# Patient Record
Sex: FEMALE | Race: WHITE | NOT HISPANIC OR LATINO
[De-identification: names, ages, dates, MRNs, and addresses within clinical notes are randomized per-mention and may not be internally consistent; named-entity substitution may affect disease eponyms.]

---

## 2017-01-03 ENCOUNTER — NON-APPOINTMENT (OUTPATIENT)
Age: 67
End: 2017-01-03

## 2017-01-03 ENCOUNTER — APPOINTMENT (OUTPATIENT)
Dept: INTERNAL MEDICINE | Facility: CLINIC | Age: 67
End: 2017-01-03

## 2017-01-03 VITALS — BODY MASS INDEX: 29.29 KG/M2 | WEIGHT: 165.34 LBS

## 2017-01-03 VITALS — TEMPERATURE: 97.5 F | HEART RATE: 68 BPM | SYSTOLIC BLOOD PRESSURE: 132 MMHG | DIASTOLIC BLOOD PRESSURE: 74 MMHG

## 2017-01-04 ENCOUNTER — RESULT CHARGE (OUTPATIENT)
Age: 67
End: 2017-01-04

## 2017-01-04 LAB
25(OH)D3 SERPL-MCNC: 26.2 NG/ML
ALBUMIN SERPL ELPH-MCNC: 4.3 G/DL
ALP BLD-CCNC: 39 U/L
ALT SERPL-CCNC: 11 U/L
ANION GAP SERPL CALC-SCNC: 16 MMOL/L
AST SERPL-CCNC: 9 U/L
BASOPHILS # BLD AUTO: 0.06 K/UL
BASOPHILS NFR BLD AUTO: 0.7 %
BILIRUB SERPL-MCNC: 0.7 MG/DL
BILIRUB UR QL STRIP: NEGATIVE
BUN SERPL-MCNC: 21 MG/DL
CALCIUM SERPL-MCNC: 9.6 MG/DL
CHLORIDE SERPL-SCNC: 103 MMOL/L
CHOLEST SERPL-MCNC: 208 MG/DL
CHOLEST/HDLC SERPL: 5.6 RATIO
CO2 SERPL-SCNC: 24 MMOL/L
CREAT SERPL-MCNC: 0.79 MG/DL
CREAT SPEC-SCNC: 108 MG/DL
EOSINOPHIL # BLD AUTO: 0.42 K/UL
EOSINOPHIL NFR BLD AUTO: 5 %
GLUCOSE SERPL-MCNC: 116 MG/DL
GLUCOSE UR-MCNC: NEGATIVE
HBA1C MFR BLD HPLC: 6.6 %
HCG UR QL: 0.2 EU/DL
HCT VFR BLD CALC: 34.8 %
HDLC SERPL-MCNC: 37 MG/DL
HGB BLD-MCNC: 11.3 G/DL
HGB UR QL STRIP.AUTO: NORMAL
IMM GRANULOCYTES NFR BLD AUTO: 0.2 %
KETONES UR-MCNC: NORMAL
LDLC SERPL CALC-MCNC: 125 MG/DL
LEUKOCYTE ESTERASE UR QL STRIP: NORMAL
LYMPHOCYTES # BLD AUTO: 2.76 K/UL
LYMPHOCYTES NFR BLD AUTO: 33.1 %
MAN DIFF?: NORMAL
MCHC RBC-ENTMCNC: 28.2 PG
MCHC RBC-ENTMCNC: 32.5 GM/DL
MCV RBC AUTO: 86.8 FL
MICROALBUMIN 24H UR DL<=1MG/L-MCNC: 4.5 MG/DL
MICROALBUMIN/CREAT 24H UR-RTO: 41 UG/MG
MONOCYTES # BLD AUTO: 0.33 K/UL
MONOCYTES NFR BLD AUTO: 4 %
NEUTROPHILS # BLD AUTO: 4.75 K/UL
NEUTROPHILS NFR BLD AUTO: 57 %
NITRITE UR QL STRIP: NEGATIVE
PH UR STRIP: 5.5
PLATELET # BLD AUTO: 231 K/UL
POTASSIUM SERPL-SCNC: 4 MMOL/L
PROT SERPL-MCNC: 6.7 G/DL
PROT UR STRIP-MCNC: NEGATIVE
RBC # BLD: 4.01 M/UL
RBC # FLD: 13.7 %
SODIUM SERPL-SCNC: 143 MMOL/L
SP GR UR STRIP: 1.03
TRIGL SERPL-MCNC: 228 MG/DL
WBC # FLD AUTO: 8.34 K/UL

## 2017-02-16 ENCOUNTER — FORM ENCOUNTER (OUTPATIENT)
Age: 67
End: 2017-02-16

## 2017-02-17 ENCOUNTER — APPOINTMENT (OUTPATIENT)
Dept: MAMMOGRAPHY | Facility: IMAGING CENTER | Age: 67
End: 2017-02-17

## 2017-02-17 ENCOUNTER — APPOINTMENT (OUTPATIENT)
Dept: RADIOLOGY | Facility: IMAGING CENTER | Age: 67
End: 2017-02-17

## 2017-02-17 ENCOUNTER — OUTPATIENT (OUTPATIENT)
Dept: OUTPATIENT SERVICES | Facility: HOSPITAL | Age: 67
LOS: 1 days | End: 2017-02-17
Payer: MEDICARE

## 2017-02-17 DIAGNOSIS — Z00.8 ENCOUNTER FOR OTHER GENERAL EXAMINATION: ICD-10-CM

## 2017-02-17 PROCEDURE — 77067 SCR MAMMO BI INCL CAD: CPT

## 2017-02-17 PROCEDURE — 77080 DXA BONE DENSITY AXIAL: CPT

## 2017-02-17 PROCEDURE — 77063 BREAST TOMOSYNTHESIS BI: CPT

## 2017-05-15 ENCOUNTER — RX RENEWAL (OUTPATIENT)
Age: 67
End: 2017-05-15

## 2017-05-18 ENCOUNTER — RX RENEWAL (OUTPATIENT)
Age: 67
End: 2017-05-18

## 2017-05-19 ENCOUNTER — RX RENEWAL (OUTPATIENT)
Age: 67
End: 2017-05-19

## 2018-02-01 ENCOUNTER — NON-APPOINTMENT (OUTPATIENT)
Age: 68
End: 2018-02-01

## 2018-02-01 ENCOUNTER — APPOINTMENT (OUTPATIENT)
Dept: INTERNAL MEDICINE | Facility: CLINIC | Age: 68
End: 2018-02-01
Payer: MEDICARE

## 2018-02-01 VITALS
DIASTOLIC BLOOD PRESSURE: 73 MMHG | SYSTOLIC BLOOD PRESSURE: 135 MMHG | HEART RATE: 74 BPM | RESPIRATION RATE: 14 BRPM | TEMPERATURE: 98.6 F

## 2018-02-01 DIAGNOSIS — T50.905A TOXIC LIVER DISEASE WITH HEPATITIS, NOT ELSEWHERE CLASSIFIED: ICD-10-CM

## 2018-02-01 DIAGNOSIS — K71.6 TOXIC LIVER DISEASE WITH HEPATITIS, NOT ELSEWHERE CLASSIFIED: ICD-10-CM

## 2018-02-01 LAB
BILIRUB UR QL STRIP: NEGATIVE
GLUCOSE UR-MCNC: NEGATIVE
HCG UR QL: 0.2 EU/DL
HGB UR QL STRIP.AUTO: NORMAL
KETONES UR-MCNC: NEGATIVE
LEUKOCYTE ESTERASE UR QL STRIP: NORMAL
NITRITE UR QL STRIP: NEGATIVE
PH UR STRIP: 5.5
PROT UR STRIP-MCNC: NEGATIVE
SP GR UR STRIP: 1.02

## 2018-02-01 PROCEDURE — 93000 ELECTROCARDIOGRAM COMPLETE: CPT | Mod: 59

## 2018-02-01 PROCEDURE — 82272 OCCULT BLD FECES 1-3 TESTS: CPT

## 2018-02-01 PROCEDURE — 36415 COLL VENOUS BLD VENIPUNCTURE: CPT

## 2018-02-01 PROCEDURE — 81003 URINALYSIS AUTO W/O SCOPE: CPT | Mod: QW

## 2018-02-01 PROCEDURE — G0439: CPT

## 2018-02-01 PROCEDURE — 99215 OFFICE O/P EST HI 40 MIN: CPT | Mod: 25

## 2018-02-02 ENCOUNTER — RX RENEWAL (OUTPATIENT)
Age: 68
End: 2018-02-02

## 2018-02-02 LAB
25(OH)D3 SERPL-MCNC: 25.3 NG/ML
ALBUMIN SERPL ELPH-MCNC: 4.3 G/DL
ALP BLD-CCNC: 36 U/L
ALT SERPL-CCNC: 11 U/L
ANION GAP SERPL CALC-SCNC: 14 MMOL/L
AST SERPL-CCNC: 15 U/L
BASOPHILS # BLD AUTO: 0.06 K/UL
BASOPHILS NFR BLD AUTO: 1.1 %
BILIRUB SERPL-MCNC: 0.8 MG/DL
BUN SERPL-MCNC: 21 MG/DL
CALCIUM SERPL-MCNC: 9.6 MG/DL
CHLORIDE SERPL-SCNC: 103 MMOL/L
CHOLEST SERPL-MCNC: 218 MG/DL
CHOLEST/HDLC SERPL: 5.3 RATIO
CO2 SERPL-SCNC: 26 MMOL/L
CREAT SERPL-MCNC: 0.9 MG/DL
CREAT SPEC-SCNC: 103 MG/DL
EOSINOPHIL # BLD AUTO: 0.27 K/UL
EOSINOPHIL NFR BLD AUTO: 4.8 %
GLUCOSE SERPL-MCNC: 95 MG/DL
HBA1C MFR BLD HPLC: 6.3 %
HCT VFR BLD CALC: 35.2 %
HDLC SERPL-MCNC: 41 MG/DL
HGB BLD-MCNC: 11.5 G/DL
IMM GRANULOCYTES NFR BLD AUTO: 0.2 %
LDLC SERPL CALC-MCNC: 129 MG/DL
LYMPHOCYTES # BLD AUTO: 2.25 K/UL
LYMPHOCYTES NFR BLD AUTO: 40.3 %
MAN DIFF?: NORMAL
MCHC RBC-ENTMCNC: 28.4 PG
MCHC RBC-ENTMCNC: 32.7 GM/DL
MCV RBC AUTO: 86.9 FL
MICROALBUMIN 24H UR DL<=1MG/L-MCNC: 2.7 MG/DL
MICROALBUMIN/CREAT 24H UR-RTO: 26 MG/G
MONOCYTES # BLD AUTO: 0.28 K/UL
MONOCYTES NFR BLD AUTO: 5 %
NEUTROPHILS # BLD AUTO: 2.72 K/UL
NEUTROPHILS NFR BLD AUTO: 48.6 %
PLATELET # BLD AUTO: 209 K/UL
POTASSIUM SERPL-SCNC: 3.7 MMOL/L
PROT SERPL-MCNC: 6.5 G/DL
RBC # BLD: 4.05 M/UL
RBC # FLD: 14.4 %
SODIUM SERPL-SCNC: 143 MMOL/L
TRIGL SERPL-MCNC: 242 MG/DL
WBC # FLD AUTO: 5.59 K/UL

## 2018-12-31 ENCOUNTER — APPOINTMENT (OUTPATIENT)
Dept: INTERNAL MEDICINE | Facility: CLINIC | Age: 68
End: 2018-12-31
Payer: MEDICARE

## 2018-12-31 ENCOUNTER — LABORATORY RESULT (OUTPATIENT)
Age: 68
End: 2018-12-31

## 2018-12-31 VITALS
OXYGEN SATURATION: 97 % | HEIGHT: 63 IN | SYSTOLIC BLOOD PRESSURE: 131 MMHG | HEART RATE: 62 BPM | BODY MASS INDEX: 27.29 KG/M2 | WEIGHT: 154 LBS | DIASTOLIC BLOOD PRESSURE: 87 MMHG

## 2018-12-31 VITALS — RESPIRATION RATE: 16 BRPM | BODY MASS INDEX: 27.28 KG/M2 | WEIGHT: 154 LBS

## 2018-12-31 DIAGNOSIS — Z13.5 ENCOUNTER FOR SCREENING FOR EYE AND EAR DISORDERS: ICD-10-CM

## 2018-12-31 DIAGNOSIS — Z12.12 ENCOUNTER FOR SCREENING FOR MALIGNANT NEOPLASM OF COLON: ICD-10-CM

## 2018-12-31 DIAGNOSIS — B35.3 TINEA PEDIS: ICD-10-CM

## 2018-12-31 DIAGNOSIS — Z12.11 ENCOUNTER FOR SCREENING FOR MALIGNANT NEOPLASM OF COLON: ICD-10-CM

## 2018-12-31 DIAGNOSIS — Z23 ENCOUNTER FOR IMMUNIZATION: ICD-10-CM

## 2018-12-31 DIAGNOSIS — R31.29 OTHER MICROSCOPIC HEMATURIA: ICD-10-CM

## 2018-12-31 PROCEDURE — G0444 DEPRESSION SCREEN ANNUAL: CPT | Mod: 59

## 2018-12-31 PROCEDURE — 90732 PPSV23 VACC 2 YRS+ SUBQ/IM: CPT

## 2018-12-31 PROCEDURE — 36415 COLL VENOUS BLD VENIPUNCTURE: CPT

## 2018-12-31 PROCEDURE — 99214 OFFICE O/P EST MOD 30 MIN: CPT | Mod: 25

## 2018-12-31 PROCEDURE — G0442 ANNUAL ALCOHOL SCREEN 15 MIN: CPT | Mod: 59

## 2018-12-31 PROCEDURE — G0009: CPT

## 2018-12-31 NOTE — COUNSELING
[Weight management counseling provided] : Weight management [ - Annual Alcohol Misuse Screening] : Annual Alcohol Misuse Screening [ - Annual Depression Screening] : Annual Depression Screening

## 2018-12-31 NOTE — ASSESSMENT
[FreeTextEntry1] : 1) Diabetes Mellitus: Patient states she does not take Janumet everyday. Patient does watch diet. Will check HgA1c. Diabetic foot exam revealed tinea pedis bilaterally. Lamisil cream sent to pharmacy. \par 2) HTN: stable continue current management\par 3) Hyperlipidemia:  Check lipid panel, discussed low fat diet and weekly 150 mins of moderate activity\par 4) Hypovitaminosis D: Check Vitamin D levels\par 5) Ovarian cyst: Patient was seen by specialist. Patient denies any bloating, change in bowel habits or early satiety.\par Referral for mammogram and colonoscopy placed. Patient declined DEXA. Pneumovax administered.\par

## 2018-12-31 NOTE — HEALTH RISK ASSESSMENT
[No falls in past year] : Patient reported no falls in the past year [0] : 2) Feeling down, depressed, or hopeless: Not at all (0) [] : No [de-identified] : None [de-identified] : None

## 2018-12-31 NOTE — PHYSICAL EXAM
[No Acute Distress] : no acute distress [Well Nourished] : well nourished [Well Developed] : well developed [Well-Appearing] : well-appearing [Normal Sclera/Conjunctiva] : normal sclera/conjunctiva [PERRL] : pupils equal round and reactive to light [EOMI] : extraocular movements intact [Normal Outer Ear/Nose] : the outer ears and nose were normal in appearance [Normal Oropharynx] : the oropharynx was normal [Normal TMs] : both tympanic membranes were normal [Normal Nasal Mucosa] : the nasal mucosa was normal [No JVD] : no jugular venous distention [Supple] : supple [No Lymphadenopathy] : no lymphadenopathy [Thyroid Normal, No Nodules] : the thyroid was normal and there were no nodules present [No Respiratory Distress] : no respiratory distress  [Clear to Auscultation] : lungs were clear to auscultation bilaterally [Normal Rate] : normal rate  [Regular Rhythm] : with a regular rhythm [Normal S1, S2] : normal S1 and S2 [No Murmur] : no murmur heard [No Carotid Bruits] : no carotid bruits [No Abdominal Bruit] : a ~M bruit was not heard ~T in the abdomen [No Varicosities] : no varicosities [Pedal Pulses Present] : the pedal pulses are present [No Edema] : there was no peripheral edema [No Extremity Clubbing/Cyanosis] : no extremity clubbing/cyanosis [No Palpable Aorta] : no palpable aorta [Normal Appearance] : normal in appearance [No Nipple Discharge] : no nipple discharge [No Axillary Lymphadenopathy] : no axillary lymphadenopathy [Soft] : abdomen soft [Non Tender] : non-tender [Non-distended] : non-distended [No Masses] : no abdominal mass palpated [No HSM] : no HSM [Normal Bowel Sounds] : normal bowel sounds [Normal Supraclavicular Nodes] : no supraclavicular lymphadenopathy [Normal Axillary Nodes] : no axillary lymphadenopathy [Normal Posterior Cervical Nodes] : no posterior cervical lymphadenopathy [Normal Anterior Cervical Nodes] : no anterior cervical lymphadenopathy [Normal Inguinal Nodes] : no inguinal lymphadenopathy [No CVA Tenderness] : no CVA  tenderness [No Spinal Tenderness] : no spinal tenderness [No Joint Swelling] : no joint swelling [Grossly Normal Strength/Tone] : grossly normal strength/tone [Normal Gait] : normal gait [Coordination Grossly Intact] : coordination grossly intact [No Focal Deficits] : no focal deficits [Speech Grossly Normal] : speech grossly normal [Normal Affect] : the affect was normal [Normal Insight/Judgement] : insight and judgment were intact [] : both feet [de-identified] : Macerations between 4th and 5th toes

## 2018-12-31 NOTE — HISTORY OF PRESENT ILLNESS
[FreeTextEntry1] : Patient presents for f/u of chronic disease management [de-identified] : Patient presents for f/u of chronic disease management. Patient has no concerns. Patient denies any falls or symptoms of depression. Patient checks blood sugars and states they are in the 90's and low 100's. Patient interested in mammogram and colonoscopy. Patient not currently interested in DEXA scan. Patient interested in opthalmology referral. Patient received influenza about one month ago. Patient interested in pneumovax.

## 2019-01-04 ENCOUNTER — MESSAGE (OUTPATIENT)
Age: 69
End: 2019-01-04

## 2019-01-04 PROBLEM — R31.29 MICROSCOPIC HEMATURIA: Status: ACTIVE | Noted: 2019-01-04

## 2019-01-04 LAB
25(OH)D3 SERPL-MCNC: 33.1 NG/ML
ALBUMIN SERPL ELPH-MCNC: 4.6 G/DL
ALP BLD-CCNC: 37 U/L
ALT SERPL-CCNC: 8 U/L
ANION GAP SERPL CALC-SCNC: 12 MMOL/L
APPEARANCE: CLEAR
APPEARANCE: CLEAR
AST SERPL-CCNC: 13 U/L
BACTERIA: NEGATIVE
BASOPHILS # BLD AUTO: 0.03 K/UL
BASOPHILS NFR BLD AUTO: 0.6 %
BILIRUB SERPL-MCNC: 1.1 MG/DL
BILIRUBIN URINE: NEGATIVE
BILIRUBIN URINE: NEGATIVE
BLOOD URINE: ABNORMAL
BLOOD URINE: ABNORMAL
BUN SERPL-MCNC: 19 MG/DL
CALCIUM SERPL-MCNC: 9.4 MG/DL
CHLORIDE SERPL-SCNC: 103 MMOL/L
CHOLEST SERPL-MCNC: 194 MG/DL
CHOLEST/HDLC SERPL: 4.3 RATIO
CO2 SERPL-SCNC: 26 MMOL/L
COLOR: YELLOW
COLOR: YELLOW
CREAT SERPL-MCNC: 0.82 MG/DL
EOSINOPHIL # BLD AUTO: 0.31 K/UL
EOSINOPHIL NFR BLD AUTO: 6.2 %
GLUCOSE QUALITATIVE U: NEGATIVE MG/DL
GLUCOSE QUALITATIVE U: NEGATIVE MG/DL
GLUCOSE SERPL-MCNC: 98 MG/DL
HBA1C MFR BLD HPLC: 6.3 %
HCT VFR BLD CALC: 34.1 %
HDLC SERPL-MCNC: 45 MG/DL
HGB BLD-MCNC: 11.1 G/DL
HYALINE CASTS: 5 /LPF
IMM GRANULOCYTES NFR BLD AUTO: 0 %
KETONES URINE: NEGATIVE
KETONES URINE: NEGATIVE
LDLC SERPL CALC-MCNC: 124 MG/DL
LEUKOCYTE ESTERASE URINE: ABNORMAL
LEUKOCYTE ESTERASE URINE: ABNORMAL
LYMPHOCYTES # BLD AUTO: 2.21 K/UL
LYMPHOCYTES NFR BLD AUTO: 44 %
MAN DIFF?: NORMAL
MCHC RBC-ENTMCNC: 27.5 PG
MCHC RBC-ENTMCNC: 32.6 GM/DL
MCV RBC AUTO: 84.4 FL
MICROSCOPIC-UA: NORMAL
MONOCYTES # BLD AUTO: 0.26 K/UL
MONOCYTES NFR BLD AUTO: 5.2 %
NEUTROPHILS # BLD AUTO: 2.21 K/UL
NEUTROPHILS NFR BLD AUTO: 44 %
NITRITE URINE: NEGATIVE
NITRITE URINE: NEGATIVE
PH URINE: 5.5
PH URINE: 5.5
PLATELET # BLD AUTO: 191 K/UL
POTASSIUM SERPL-SCNC: 3.9 MMOL/L
PROT SERPL-MCNC: 6.6 G/DL
PROTEIN URINE: NEGATIVE MG/DL
PROTEIN URINE: NEGATIVE MG/DL
RBC # BLD: 4.04 M/UL
RBC # FLD: 13.9 %
RED BLOOD CELLS URINE: 23 /HPF
SODIUM SERPL-SCNC: 141 MMOL/L
SPECIFIC GRAVITY URINE: 1.02
SPECIFIC GRAVITY URINE: 1.02
SQUAMOUS EPITHELIAL CELLS: 1 /HPF
TRIGL SERPL-MCNC: 126 MG/DL
TSH SERPL-ACNC: 2.61 UIU/ML
UROBILINOGEN URINE: NEGATIVE MG/DL
UROBILINOGEN URINE: NEGATIVE MG/DL
WBC # FLD AUTO: 5.02 K/UL
WHITE BLOOD CELLS URINE: 14 /HPF

## 2019-01-09 ENCOUNTER — FORM ENCOUNTER (OUTPATIENT)
Age: 69
End: 2019-01-09

## 2019-01-10 ENCOUNTER — APPOINTMENT (OUTPATIENT)
Dept: MAMMOGRAPHY | Facility: IMAGING CENTER | Age: 69
End: 2019-01-10
Payer: MEDICARE

## 2019-01-10 ENCOUNTER — OUTPATIENT (OUTPATIENT)
Dept: OUTPATIENT SERVICES | Facility: HOSPITAL | Age: 69
LOS: 1 days | End: 2019-01-10
Payer: MEDICARE

## 2019-01-10 DIAGNOSIS — Z00.8 ENCOUNTER FOR OTHER GENERAL EXAMINATION: ICD-10-CM

## 2019-01-10 PROCEDURE — 77067 SCR MAMMO BI INCL CAD: CPT

## 2019-01-10 PROCEDURE — 77063 BREAST TOMOSYNTHESIS BI: CPT | Mod: 26

## 2019-01-10 PROCEDURE — 77067 SCR MAMMO BI INCL CAD: CPT | Mod: 26

## 2019-01-10 PROCEDURE — 77063 BREAST TOMOSYNTHESIS BI: CPT

## 2019-02-05 ENCOUNTER — APPOINTMENT (OUTPATIENT)
Dept: INTERNAL MEDICINE | Facility: CLINIC | Age: 69
End: 2019-02-05
Payer: MEDICARE

## 2019-02-05 VITALS
HEART RATE: 74 BPM | OXYGEN SATURATION: 98 % | TEMPERATURE: 99 F | DIASTOLIC BLOOD PRESSURE: 78 MMHG | SYSTOLIC BLOOD PRESSURE: 132 MMHG | RESPIRATION RATE: 16 BRPM

## 2019-02-05 VITALS
HEIGHT: 63 IN | OXYGEN SATURATION: 96 % | DIASTOLIC BLOOD PRESSURE: 76 MMHG | HEART RATE: 69 BPM | SYSTOLIC BLOOD PRESSURE: 127 MMHG | TEMPERATURE: 97.6 F

## 2019-02-05 PROCEDURE — 99213 OFFICE O/P EST LOW 20 MIN: CPT

## 2019-02-05 NOTE — ASSESSMENT
[FreeTextEntry1] : 1) Upper airway cough syndrome: Send azithromycin and mucinex to pharmacy. First generation antihistamine sent. Discussed side effects of medication with patient.

## 2019-02-05 NOTE — PHYSICAL EXAM
[No Acute Distress] : no acute distress [Well Nourished] : well nourished [Well Developed] : well developed [Well-Appearing] : well-appearing [Normal Sclera/Conjunctiva] : normal sclera/conjunctiva [PERRL] : pupils equal round and reactive to light [EOMI] : extraocular movements intact [Normal Outer Ear/Nose] : the outer ears and nose were normal in appearance [Normal Oropharynx] : the oropharynx was normal [Normal TMs] : both tympanic membranes were normal [Normal Nasal Mucosa] : the nasal mucosa was normal [No JVD] : no jugular venous distention [Supple] : supple [No Lymphadenopathy] : no lymphadenopathy [Thyroid Normal, No Nodules] : the thyroid was normal and there were no nodules present [No Respiratory Distress] : no respiratory distress  [Clear to Auscultation] : lungs were clear to auscultation bilaterally [No Accessory Muscle Use] : no accessory muscle use [Normal Rate] : normal rate  [Regular Rhythm] : with a regular rhythm [Normal S1, S2] : normal S1 and S2 [No Murmur] : no murmur heard [No Carotid Bruits] : no carotid bruits [No Abdominal Bruit] : a ~M bruit was not heard ~T in the abdomen [No Varicosities] : no varicosities [Pedal Pulses Present] : the pedal pulses are present [No Edema] : there was no peripheral edema [No Extremity Clubbing/Cyanosis] : no extremity clubbing/cyanosis [No Palpable Aorta] : no palpable aorta [Normal Supraclavicular Nodes] : no supraclavicular lymphadenopathy [Normal Axillary Nodes] : no axillary lymphadenopathy [Normal Posterior Cervical Nodes] : no posterior cervical lymphadenopathy [Normal Anterior Cervical Nodes] : no anterior cervical lymphadenopathy [Normal Inguinal Nodes] : no inguinal lymphadenopathy

## 2019-02-05 NOTE — HISTORY OF PRESENT ILLNESS
[FreeTextEntry8] : Patient presents with a one week history of cough. Cough is productive with white sputum. Patient states cough is worse at night. Patient also has associated congestion. Patient denies any fevers, chills, nausea, vomiting or wheezing.

## 2019-02-07 ENCOUNTER — APPOINTMENT (OUTPATIENT)
Dept: GASTROENTEROLOGY | Facility: CLINIC | Age: 69
End: 2019-02-07
Payer: MEDICARE

## 2019-02-07 VITALS
SYSTOLIC BLOOD PRESSURE: 125 MMHG | DIASTOLIC BLOOD PRESSURE: 67 MMHG | HEIGHT: 63 IN | BODY MASS INDEX: 27.46 KG/M2 | WEIGHT: 155 LBS | HEART RATE: 62 BPM

## 2019-02-07 DIAGNOSIS — Z86.010 PERSONAL HISTORY OF COLONIC POLYPS: ICD-10-CM

## 2019-02-07 DIAGNOSIS — Z82.49 FAMILY HISTORY OF ISCHEMIC HEART DISEASE AND OTHER DISEASES OF THE CIRCULATORY SYSTEM: ICD-10-CM

## 2019-02-07 DIAGNOSIS — E66.3 OVERWEIGHT: ICD-10-CM

## 2019-02-07 DIAGNOSIS — Z80.42 FAMILY HISTORY OF MALIGNANT NEOPLASM OF PROSTATE: ICD-10-CM

## 2019-02-07 PROCEDURE — 99204 OFFICE O/P NEW MOD 45 MIN: CPT

## 2019-02-07 PROCEDURE — 82274 ASSAY TEST FOR BLOOD FECAL: CPT | Mod: QW

## 2019-02-07 RX ORDER — GUAIFENESIN 600 MG/1
600 TABLET, EXTENDED RELEASE ORAL
Qty: 20 | Refills: 0 | Status: DISCONTINUED | COMMUNITY
Start: 2019-02-05 | End: 2019-02-07

## 2019-02-07 RX ORDER — ERGOCALCIFEROL 1.25 MG/1
1.25 MG CAPSULE, LIQUID FILLED ORAL
Qty: 12 | Refills: 2 | Status: DISCONTINUED | COMMUNITY
Start: 2017-01-04 | End: 2019-02-07

## 2019-02-07 RX ORDER — AZITHROMYCIN 250 MG/1
250 TABLET, FILM COATED ORAL
Qty: 1 | Refills: 0 | Status: DISCONTINUED | COMMUNITY
Start: 2019-02-05 | End: 2019-02-07

## 2019-02-07 RX ORDER — TERBINAFINE HCL 1 %
1 CREAM (GRAM) TOPICAL 3 TIMES DAILY
Qty: 1 | Refills: 3 | Status: DISCONTINUED | COMMUNITY
Start: 2018-12-31 | End: 2019-02-07

## 2019-02-07 RX ORDER — CAMPHOR 0.45 %
25 GEL (GRAM) TOPICAL
Qty: 30 | Refills: 0 | Status: DISCONTINUED | COMMUNITY
Start: 2019-02-05 | End: 2019-02-07

## 2019-02-07 NOTE — REASON FOR VISIT
[Follow-Up: _____] : a [unfilled] follow-up visit [Other: _____] : [unfilled] [FreeTextEntry1] : Routine follow-up visit for a colonoscopy

## 2019-02-07 NOTE — ASSESSMENT
[FreeTextEntry1] : 1. Tubular adenomas, hemorrhoids at baseline colonoscopy February 2014--rule out metachronous colorectal neoplasm.\par 2. Borderline anemia, unclear if iron deficiency and/or chronic disease. Possible aspirin-induced irritation.\par 3. URI, on antibiotics.\par 4. Hypercholesterolemia.\par 5. Overweight.\par 6. History of type 2 diabetes mellitus.\par \par Plan:\par 1. Labs reviewed.\par 2. Although she had prescheduled surveillance colonoscopy for 2/11AM, because of URI, this needs to be postponed. Procedure, rationale, alternatives, material risks, anesthesia plan, MiraLax prep instructions were reviewed and brochure given.\par 3. If iron deficient, consider EGD, as well.

## 2019-02-07 NOTE — CONSULT LETTER
[Dear  ___] : Dear  [unfilled], [Consult Letter:] : I had the pleasure of evaluating your patient, [unfilled]. [Please see my note below.] : Please see my note below. [Consult Closing:] : Thank you very much for allowing me to participate in the care of this patient.  If you have any questions, please do not hesitate to contact me. [Sincerely,] : Sincerely, [FreeTextEntry3] : Sergio Forbes M.D.\par

## 2019-02-07 NOTE — HISTORY OF PRESENT ILLNESS
[FreeTextEntry1] : Two tubular adenomas were removed at baseline colonoscopy February 2014. She denies GI symptoms at this time, but is taking azithromycin for URI. Labs reveal borderline anemia. She has been taking baby aspirin daily for quite some time.

## 2019-02-07 NOTE — PHYSICAL EXAM
[General Appearance - Alert] : alert [General Appearance - In No Acute Distress] : in no acute distress [General Appearance - Well Nourished] : well nourished [General Appearance - Well Developed] : well developed [Sclera] : the sclera and conjunctiva were normal [Outer Ear] : the ears and nose were normal in appearance [Oropharynx] : the oropharynx was normal [Neck Appearance] : the appearance of the neck was normal [Neck Cervical Mass (___cm)] : no neck mass was observed [Jugular Venous Distention Increased] : there was no jugular-venous distention [Thyroid Diffuse Enlargement] : the thyroid was not enlarged [Thyroid Nodule] : there were no palpable thyroid nodules [Auscultation Breath Sounds / Voice Sounds] : lungs were clear to auscultation bilaterally [Heart Rate And Rhythm] : heart rate was normal and rhythm regular [Heart Sounds] : normal S1 and S2 [Heart Sounds Gallop] : no gallops [Murmurs] : no murmurs [Heart Sounds Pericardial Friction Rub] : no pericardial rub [Full Pulse] : the pedal pulses are present [Edema] : there was no peripheral edema [Bowel Sounds] : normal bowel sounds [Abdomen Soft] : soft [Abdomen Tenderness] : non-tender [Abdomen Mass (___ Cm)] : no abdominal mass palpated [Abdomen Hernia] : no hernia was discovered [Normal Sphincter Tone] : normal sphincter tone [No Rectal Mass] : no rectal mass [Cervical Lymph Nodes Enlarged Posterior Bilaterally] : posterior cervical [Cervical Lymph Nodes Enlarged Anterior Bilaterally] : anterior cervical [Supraclavicular Lymph Nodes Enlarged Bilaterally] : supraclavicular [Axillary Lymph Nodes Enlarged Bilaterally] : axillary [Femoral Lymph Nodes Enlarged Bilaterally] : femoral [Inguinal Lymph Nodes Enlarged Bilaterally] : inguinal [No CVA Tenderness] : no ~M costovertebral angle tenderness [No Spinal Tenderness] : no spinal tenderness [Abnormal Walk] : normal gait [Nail Clubbing] : no clubbing  or cyanosis of the fingernails [Musculoskeletal - Swelling] : no joint swelling seen [Motor Tone] : muscle strength and tone were normal [Skin Color & Pigmentation] : normal skin color and pigmentation [Skin Turgor] : normal skin turgor [] : no rash [Oriented To Time, Place, And Person] : oriented to person, place, and time [Impaired Insight] : insight and judgment were intact [Affect] : the affect was normal [Internal Hemorrhoid] : no internal hemorrhoids [External Hemorrhoid] : no external hemorrhoids [Occult Blood Positive] : stool was negative for occult blood [FreeTextEntry1] : FIT negative

## 2019-02-11 ENCOUNTER — APPOINTMENT (OUTPATIENT)
Dept: GASTROENTEROLOGY | Facility: CLINIC | Age: 69
End: 2019-02-11
Payer: MEDICARE

## 2019-02-18 ENCOUNTER — RX RENEWAL (OUTPATIENT)
Age: 69
End: 2019-02-18

## 2019-02-20 ENCOUNTER — APPOINTMENT (OUTPATIENT)
Dept: GASTROENTEROLOGY | Facility: CLINIC | Age: 69
End: 2019-02-20
Payer: MEDICARE

## 2019-02-20 PROCEDURE — 45378 DIAGNOSTIC COLONOSCOPY: CPT

## 2019-02-25 ENCOUNTER — RX RENEWAL (OUTPATIENT)
Age: 69
End: 2019-02-25

## 2019-07-03 ENCOUNTER — RX RENEWAL (OUTPATIENT)
Age: 69
End: 2019-07-03

## 2019-12-27 ENCOUNTER — LABORATORY RESULT (OUTPATIENT)
Age: 69
End: 2019-12-27

## 2019-12-27 ENCOUNTER — APPOINTMENT (OUTPATIENT)
Dept: INTERNAL MEDICINE | Facility: CLINIC | Age: 69
End: 2019-12-27
Payer: MEDICARE

## 2019-12-27 ENCOUNTER — NON-APPOINTMENT (OUTPATIENT)
Age: 69
End: 2019-12-27

## 2019-12-27 ENCOUNTER — MED ADMIN CHARGE (OUTPATIENT)
Age: 69
End: 2019-12-27

## 2019-12-27 VITALS
TEMPERATURE: 97.8 F | DIASTOLIC BLOOD PRESSURE: 79 MMHG | SYSTOLIC BLOOD PRESSURE: 139 MMHG | WEIGHT: 154.53 LBS | HEIGHT: 62.6 IN | OXYGEN SATURATION: 97 % | HEART RATE: 62 BPM | BODY MASS INDEX: 27.72 KG/M2

## 2019-12-27 DIAGNOSIS — L98.9 DISORDER OF THE SKIN AND SUBCUTANEOUS TISSUE, UNSPECIFIED: ICD-10-CM

## 2019-12-27 DIAGNOSIS — M17.12 UNILATERAL PRIMARY OSTEOARTHRITIS, LEFT KNEE: ICD-10-CM

## 2019-12-27 DIAGNOSIS — R94.31 ABNORMAL ELECTROCARDIOGRAM [ECG] [EKG]: ICD-10-CM

## 2019-12-27 DIAGNOSIS — D64.9 ANEMIA, UNSPECIFIED: ICD-10-CM

## 2019-12-27 DIAGNOSIS — Z01.818 ENCOUNTER FOR OTHER PREPROCEDURAL EXAMINATION: ICD-10-CM

## 2019-12-27 PROCEDURE — G0439: CPT

## 2019-12-27 PROCEDURE — G0009: CPT

## 2019-12-27 PROCEDURE — 90670 PCV13 VACCINE IM: CPT

## 2019-12-27 PROCEDURE — 93000 ELECTROCARDIOGRAM COMPLETE: CPT

## 2019-12-27 PROCEDURE — 99214 OFFICE O/P EST MOD 30 MIN: CPT | Mod: 25

## 2019-12-27 PROCEDURE — 36415 COLL VENOUS BLD VENIPUNCTURE: CPT

## 2019-12-30 LAB
25(OH)D3 SERPL-MCNC: 33.9 NG/ML
ALBUMIN SERPL ELPH-MCNC: 4.7 G/DL
ALP BLD-CCNC: 40 U/L
ALT SERPL-CCNC: 8 U/L
ANION GAP SERPL CALC-SCNC: 11 MMOL/L
APPEARANCE: CLEAR
APPEARANCE: CLEAR
APTT BLD: 32.9 SEC
AST SERPL-CCNC: 12 U/L
BACTERIA: NEGATIVE
BASOPHILS # BLD AUTO: 0.05 K/UL
BASOPHILS NFR BLD AUTO: 1 %
BILIRUB SERPL-MCNC: 0.8 MG/DL
BILIRUBIN URINE: NEGATIVE
BILIRUBIN URINE: NEGATIVE
BLOOD URINE: ABNORMAL
BLOOD URINE: ABNORMAL
BUN SERPL-MCNC: 18 MG/DL
CALCIUM SERPL-MCNC: 9.8 MG/DL
CHLORIDE SERPL-SCNC: 101 MMOL/L
CHOLEST SERPL-MCNC: 278 MG/DL
CHOLEST/HDLC SERPL: 6.3 RATIO
CO2 SERPL-SCNC: 27 MMOL/L
COLOR: ABNORMAL
COLOR: ABNORMAL
CREAT SERPL-MCNC: 0.97 MG/DL
CREAT SPEC-SCNC: 122 MG/DL
EOSINOPHIL # BLD AUTO: 0.36 K/UL
EOSINOPHIL NFR BLD AUTO: 6.9 %
ESTIMATED AVERAGE GLUCOSE: 126 MG/DL
FERRITIN SERPL-MCNC: 203 NG/ML
GLUCOSE QUALITATIVE U: NEGATIVE
GLUCOSE QUALITATIVE U: NEGATIVE
GLUCOSE SERPL-MCNC: 96 MG/DL
HBA1C MFR BLD HPLC: 6 %
HCT VFR BLD CALC: 36.2 %
HCV AB SER QL: NONREACTIVE
HCV S/CO RATIO: 0.13 S/CO
HDLC SERPL-MCNC: 44 MG/DL
HGB BLD-MCNC: 11.9 G/DL
HYALINE CASTS: 1 /LPF
IMM GRANULOCYTES NFR BLD AUTO: 0.2 %
INR PPP: 1 RATIO
IRON SATN MFR SERPL: 28 %
IRON SERPL-MCNC: 74 UG/DL
KETONES URINE: NEGATIVE
KETONES URINE: NEGATIVE
LDLC SERPL CALC-MCNC: 190 MG/DL
LEUKOCYTE ESTERASE URINE: ABNORMAL
LEUKOCYTE ESTERASE URINE: ABNORMAL
LYMPHOCYTES # BLD AUTO: 2.17 K/UL
LYMPHOCYTES NFR BLD AUTO: 41.7 %
MAN DIFF?: NORMAL
MCHC RBC-ENTMCNC: 29.2 PG
MCHC RBC-ENTMCNC: 32.9 GM/DL
MCV RBC AUTO: 88.7 FL
MICROALBUMIN 24H UR DL<=1MG/L-MCNC: 4.5 MG/DL
MICROALBUMIN/CREAT 24H UR-RTO: 37 MG/G
MICROSCOPIC-UA: NORMAL
MONOCYTES # BLD AUTO: 0.33 K/UL
MONOCYTES NFR BLD AUTO: 6.3 %
NEUTROPHILS # BLD AUTO: 2.29 K/UL
NEUTROPHILS NFR BLD AUTO: 43.9 %
NITRITE URINE: NEGATIVE
NITRITE URINE: NEGATIVE
PH URINE: 6
PH URINE: 6
PLATELET # BLD AUTO: 196 K/UL
POTASSIUM SERPL-SCNC: 3.8 MMOL/L
PROT SERPL-MCNC: 7.1 G/DL
PROTEIN URINE: NORMAL
PROTEIN URINE: NORMAL
PT BLD: 11.3 SEC
RBC # BLD: 4.08 M/UL
RBC # FLD: 13.6 %
RED BLOOD CELLS URINE: 7 /HPF
SODIUM SERPL-SCNC: 139 MMOL/L
SPECIFIC GRAVITY URINE: 1.02
SPECIFIC GRAVITY URINE: 1.02
SQUAMOUS EPITHELIAL CELLS: 1 /HPF
TIBC SERPL-MCNC: 261 UG/DL
TRIGL SERPL-MCNC: 218 MG/DL
TSH SERPL-ACNC: 3.13 UIU/ML
UIBC SERPL-MCNC: 187 UG/DL
UROBILINOGEN URINE: NORMAL
UROBILINOGEN URINE: NORMAL
VIT B12 SERPL-MCNC: 908 PG/ML
WBC # FLD AUTO: 5.21 K/UL
WHITE BLOOD CELLS URINE: 8 /HPF

## 2019-12-30 NOTE — PHYSICAL EXAM
[Normal Appearance] : normal in appearance [No Axillary Lymphadenopathy] : no axillary lymphadenopathy [No Nipple Discharge] : no nipple discharge [No Masses] : no palpable masses [Comprehensive Foot Exam Normal] : Right and left foot were examined and both feet are normal. No ulcers in either foot. Toes are normal and with full ROM.  Normal tactile sensation with monofilament testing throughout both feet [Normal] : affect was normal and insight and judgment were intact [de-identified] : hyperpigemented patch on the left cheek

## 2019-12-30 NOTE — REASON FOR VISIT
[Annual Wellness Visit] : an annual wellness visit [FreeTextEntry1] : Subsequent Medicare wellness visit

## 2019-12-30 NOTE — ASSESSMENT
[FreeTextEntry1] : 1) Medicare annual wellness visit: mammogram prescription given to patient, prevnar administered today. Patient [place]. \par 2) Abnormal EKG: EKG showed abnormal RR intervals. Patient is asymptomatic given her risk factors advised to followup with the cardiologist, referral placed\par 3) HTN: stable, continue current management\par 4) DM: check HgA1c, advised ADA diet, patient follows with ophthalmologist\par 5) hyperlipidemia: Check lipid panel advised low-fat diet\par 6) anemia: Followup CBC and also iron studies. Patient had normal colonoscopy in February of 2019\par 7) skin lesion: Suspect seborrheic keratoses however some telangiectasias seen on anoscopy. Will place dermatology referral\par 8) Low Vitamin D: check levels\par 9) Preoperative clearance: medical clearance pending blood work\par 10) arthritis of left knee: advised heat and ice, and exercise. Omega 3 fatty acid sent as per patient request

## 2019-12-30 NOTE — HEALTH RISK ASSESSMENT
[Good] : ~his/her~  mood as  good [No] : No [No falls in past year] : Patient reported no falls in the past year [0] : 2) Feeling down, depressed, or hopeless: Not at all (0) [Patient reported mammogram was normal] : Patient reported mammogram was normal [None] : None [] :  [With Significant Other] : lives with significant other [Feels Safe at Home] : Feels safe at home [Fully functional (bathing, dressing, toileting, transferring, walking, feeding)] : Fully functional (bathing, dressing, toileting, transferring, walking, feeding) [Fully functional (using the telephone, shopping, preparing meals, housekeeping, doing laundry, using] : Fully functional and needs no help or supervision to perform IADLs (using the telephone, shopping, preparing meals, housekeeping, doing laundry, using transportation, managing medications and managing finances) [Reports changes in vision] : Reports changes in vision [Smoke Detector] : smoke detector [Carbon Monoxide Detector] : carbon monoxide detector [Seat Belt] :  uses seat belt [Reviewed no changes] : Reviewed no changes [FreeTextEntry1] : Overall health maintenance [de-identified] : none [] : No [de-identified] : none [Language] : denies difficulty with language [Handling Complex Tasks] : denies difficulty handling complex tasks [Behavior] : denies difficulty with behavior [Learning/Retaining New Information] : denies difficulty learning/retaining new information [Spatial Ability and Orientation] : denies difficulty with spatial ability and orientation [Reasoning] : denies difficulty with reasoning [Reports changes in hearing] : Reports no changes in hearing [Reports changes in dental health] : Reports no changes in dental health [Reports normal functional visual acuity (ie: able to read med bottle)] : Reports poor functional visual acuity.  [Safety elements used in home] : no safety elements used in home [Guns at Home] : no guns at home [MammogramDate] : 1/2019 [ColonoscopyDate] : 2/2019 [ColonoscopyComments] : repeat 2024 [MammogramComments] : order placed for mammogram [de-identified] : ordered [de-identified] : Follows with opthalmology will have cataract surgery [AdvancecareDate] : 12/2019

## 2020-01-03 ENCOUNTER — MESSAGE (OUTPATIENT)
Age: 70
End: 2020-01-03

## 2020-01-10 ENCOUNTER — RX RENEWAL (OUTPATIENT)
Age: 70
End: 2020-01-10

## 2021-02-22 ENCOUNTER — APPOINTMENT (OUTPATIENT)
Dept: INTERNAL MEDICINE | Facility: CLINIC | Age: 71
End: 2021-02-22

## 2021-02-25 ENCOUNTER — APPOINTMENT (OUTPATIENT)
Dept: INTERNAL MEDICINE | Facility: CLINIC | Age: 71
End: 2021-02-25
Payer: MEDICARE

## 2021-02-25 ENCOUNTER — LABORATORY RESULT (OUTPATIENT)
Age: 71
End: 2021-02-25

## 2021-02-25 VITALS
HEIGHT: 61.42 IN | DIASTOLIC BLOOD PRESSURE: 78 MMHG | WEIGHT: 159.38 LBS | TEMPERATURE: 98.2 F | BODY MASS INDEX: 29.7 KG/M2 | SYSTOLIC BLOOD PRESSURE: 134 MMHG | OXYGEN SATURATION: 98 % | HEART RATE: 68 BPM

## 2021-02-25 DIAGNOSIS — R05 COUGH: ICD-10-CM

## 2021-02-25 DIAGNOSIS — Z12.31 ENCOUNTER FOR SCREENING MAMMOGRAM FOR MALIGNANT NEOPLASM OF BREAST: ICD-10-CM

## 2021-02-25 PROCEDURE — 36415 COLL VENOUS BLD VENIPUNCTURE: CPT

## 2021-02-25 PROCEDURE — G0439: CPT

## 2021-02-25 PROCEDURE — 99214 OFFICE O/P EST MOD 30 MIN: CPT | Mod: 25

## 2021-02-25 NOTE — ASSESSMENT
[FreeTextEntry1] : Patient presents for Medicare annual wellness visit and chronic disease management check hemoglobin A1c blood pressure stable to attempt trial of Pepcid for upper airway cough syndrome mammogram ordered.  Check urine microalbumin.

## 2021-02-25 NOTE — HISTORY OF PRESENT ILLNESS
[FreeTextEntry1] : Patient presents for Medicare annual wellness visit and chronic disease management [de-identified] : Feels well has occasional knee pain has seen orthopedist, sometimes has a cough has been to ENT was advised to take Flonase, has clear phlegm denies any hoarseness in the voice denies any difficulty swallowing chest pain chest tightness denies any hypoglycemic events.  Interested in mammogram not interested in bone density received first dose of Covid vaccine.

## 2021-02-25 NOTE — HEALTH RISK ASSESSMENT
[Good] : ~his/her~  mood as  good [FreeTextEntry1] : Health maintenance [] : No [de-identified] : None

## 2021-03-04 ENCOUNTER — NON-APPOINTMENT (OUTPATIENT)
Age: 71
End: 2021-03-04

## 2021-03-04 LAB
25(OH)D3 SERPL-MCNC: 40.8 NG/ML
ALBUMIN SERPL ELPH-MCNC: 4.5 G/DL
ALP BLD-CCNC: 48 U/L
ALT SERPL-CCNC: 10 U/L
ANION GAP SERPL CALC-SCNC: 13 MMOL/L
APPEARANCE: CLEAR
APPEARANCE: CLEAR
AST SERPL-CCNC: 16 U/L
BACTERIA: NEGATIVE
BASOPHILS # BLD AUTO: 0.06 K/UL
BASOPHILS NFR BLD AUTO: 1 %
BILIRUB SERPL-MCNC: 1 MG/DL
BILIRUBIN URINE: NEGATIVE
BILIRUBIN URINE: NEGATIVE
BLOOD URINE: NEGATIVE
BLOOD URINE: NEGATIVE
BUN SERPL-MCNC: 22 MG/DL
CALCIUM SERPL-MCNC: 9.9 MG/DL
CHLORIDE SERPL-SCNC: 101 MMOL/L
CHOLEST SERPL-MCNC: 225 MG/DL
CO2 SERPL-SCNC: 24 MMOL/L
COLOR: YELLOW
COLOR: YELLOW
CREAT SERPL-MCNC: 1.02 MG/DL
CREAT SPEC-SCNC: 117 MG/DL
EOSINOPHIL # BLD AUTO: 0.23 K/UL
EOSINOPHIL NFR BLD AUTO: 3.9 %
ESTIMATED AVERAGE GLUCOSE: 137 MG/DL
GLUCOSE QUALITATIVE U: NEGATIVE
GLUCOSE QUALITATIVE U: NEGATIVE
GLUCOSE SERPL-MCNC: 110 MG/DL
HBA1C MFR BLD HPLC: 6.4 %
HCT VFR BLD CALC: 36.1 %
HDLC SERPL-MCNC: 39 MG/DL
HGB BLD-MCNC: 11.6 G/DL
HYALINE CASTS: 1 /LPF
IMM GRANULOCYTES NFR BLD AUTO: 0.2 %
KETONES URINE: NEGATIVE
KETONES URINE: NEGATIVE
LDLC SERPL CALC-MCNC: 143 MG/DL
LEUKOCYTE ESTERASE URINE: ABNORMAL
LEUKOCYTE ESTERASE URINE: ABNORMAL
LYMPHOCYTES # BLD AUTO: 2.35 K/UL
LYMPHOCYTES NFR BLD AUTO: 40.2 %
MAN DIFF?: NORMAL
MCHC RBC-ENTMCNC: 29.3 PG
MCHC RBC-ENTMCNC: 32.1 GM/DL
MCV RBC AUTO: 91.2 FL
MICROALBUMIN 24H UR DL<=1MG/L-MCNC: 7.8 MG/DL
MICROALBUMIN/CREAT 24H UR-RTO: 66 MG/G
MICROSCOPIC-UA: NORMAL
MONOCYTES # BLD AUTO: 0.3 K/UL
MONOCYTES NFR BLD AUTO: 5.1 %
NEUTROPHILS # BLD AUTO: 2.89 K/UL
NEUTROPHILS NFR BLD AUTO: 49.6 %
NITRITE URINE: NEGATIVE
NITRITE URINE: NEGATIVE
NONHDLC SERPL-MCNC: 186 MG/DL
PH URINE: 5.5
PH URINE: 5.5
PLATELET # BLD AUTO: 205 K/UL
POTASSIUM SERPL-SCNC: 3.8 MMOL/L
PROT SERPL-MCNC: 6.9 G/DL
PROTEIN URINE: NORMAL
PROTEIN URINE: NORMAL
RBC # BLD: 3.96 M/UL
RBC # FLD: 14.1 %
RED BLOOD CELLS URINE: 8 /HPF
SODIUM SERPL-SCNC: 139 MMOL/L
SPECIFIC GRAVITY URINE: 1.02
SPECIFIC GRAVITY URINE: 1.02
SQUAMOUS EPITHELIAL CELLS: 1 /HPF
TRIGL SERPL-MCNC: 214 MG/DL
TSH SERPL-ACNC: 3.1 UIU/ML
UROBILINOGEN URINE: NORMAL
UROBILINOGEN URINE: NORMAL
VIT B12 SERPL-MCNC: 725 PG/ML
WBC # FLD AUTO: 5.84 K/UL
WHITE BLOOD CELLS URINE: 18 /HPF

## 2021-03-23 ENCOUNTER — RESULT REVIEW (OUTPATIENT)
Age: 71
End: 2021-03-23

## 2021-03-23 ENCOUNTER — OUTPATIENT (OUTPATIENT)
Dept: OUTPATIENT SERVICES | Facility: HOSPITAL | Age: 71
LOS: 1 days | End: 2021-03-23
Payer: MEDICARE

## 2021-03-23 ENCOUNTER — APPOINTMENT (OUTPATIENT)
Dept: MAMMOGRAPHY | Facility: CLINIC | Age: 71
End: 2021-03-23
Payer: MEDICARE

## 2021-03-23 DIAGNOSIS — Z12.31 ENCOUNTER FOR SCREENING MAMMOGRAM FOR MALIGNANT NEOPLASM OF BREAST: ICD-10-CM

## 2021-03-23 PROCEDURE — 77067 SCR MAMMO BI INCL CAD: CPT | Mod: 26

## 2021-03-23 PROCEDURE — 77063 BREAST TOMOSYNTHESIS BI: CPT | Mod: 26

## 2021-03-23 PROCEDURE — 77067 SCR MAMMO BI INCL CAD: CPT

## 2021-03-23 PROCEDURE — 77063 BREAST TOMOSYNTHESIS BI: CPT

## 2021-03-30 ENCOUNTER — NON-APPOINTMENT (OUTPATIENT)
Age: 71
End: 2021-03-30

## 2021-04-05 DIAGNOSIS — Z78.9 OTHER SPECIFIED HEALTH STATUS: ICD-10-CM

## 2021-04-07 ENCOUNTER — APPOINTMENT (OUTPATIENT)
Dept: INTERNAL MEDICINE | Facility: CLINIC | Age: 71
End: 2021-04-07

## 2021-04-07 LAB — SARS-COV-2 N GENE NPH QL NAA+PROBE: NOT DETECTED

## 2021-12-30 ENCOUNTER — LABORATORY RESULT (OUTPATIENT)
Age: 71
End: 2021-12-30

## 2021-12-30 ENCOUNTER — APPOINTMENT (OUTPATIENT)
Dept: INTERNAL MEDICINE | Facility: CLINIC | Age: 71
End: 2021-12-30
Payer: MEDICARE

## 2021-12-30 VITALS
HEIGHT: 61.42 IN | WEIGHT: 156.53 LBS | BODY MASS INDEX: 29.17 KG/M2 | TEMPERATURE: 98 F | SYSTOLIC BLOOD PRESSURE: 135 MMHG | HEART RATE: 59 BPM | DIASTOLIC BLOOD PRESSURE: 70 MMHG | OXYGEN SATURATION: 97 %

## 2021-12-30 DIAGNOSIS — M81.0 AGE-RELATED OSTEOPOROSIS W/OUT CURRENT PATHOLOGICAL FRACTURE: ICD-10-CM

## 2021-12-30 DIAGNOSIS — Z11.59 ENCOUNTER FOR SCREENING FOR OTHER VIRAL DISEASES: ICD-10-CM

## 2021-12-30 DIAGNOSIS — E55.9 VITAMIN D DEFICIENCY, UNSPECIFIED: ICD-10-CM

## 2021-12-30 PROCEDURE — 99214 OFFICE O/P EST MOD 30 MIN: CPT | Mod: 25

## 2021-12-30 PROCEDURE — 36415 COLL VENOUS BLD VENIPUNCTURE: CPT

## 2021-12-30 RX ORDER — MULTIVIT-MIN/FA/LYCOPEN/LUTEIN .4-300-25
TABLET ORAL DAILY
Qty: 90 | Refills: 3 | Status: ACTIVE | COMMUNITY
Start: 2019-12-27 | End: 1900-01-01

## 2022-01-02 NOTE — ASSESSMENT
[FreeTextEntry1] : Blood work done today\par Discussed healthy diet exercise check A1c levels continue current management for osteoporosis

## 2022-01-02 NOTE — HISTORY OF PRESENT ILLNESS
[FreeTextEntry1] : Patient presents for follow-up [de-identified] : Currently feels well\par Has no acute concerns denies any chest pain chest tightness shortness of breath\par Denies any dizziness\par Denies any hypoglycemic events

## 2022-01-09 LAB
25(OH)D3 SERPL-MCNC: 37.1 NG/ML
ALBUMIN SERPL ELPH-MCNC: 4.6 G/DL
ALP BLD-CCNC: 47 U/L
ALT SERPL-CCNC: 10 U/L
ANION GAP SERPL CALC-SCNC: 12 MMOL/L
APPEARANCE: CLEAR
APPEARANCE: CLEAR
AST SERPL-CCNC: 12 U/L
BACTERIA: NEGATIVE
BASOPHILS # BLD AUTO: 0.08 K/UL
BASOPHILS NFR BLD AUTO: 1.3 %
BILIRUB SERPL-MCNC: 0.8 MG/DL
BILIRUBIN URINE: NEGATIVE
BILIRUBIN URINE: NEGATIVE
BLOOD URINE: ABNORMAL
BLOOD URINE: ABNORMAL
BUN SERPL-MCNC: 20 MG/DL
CALCIUM SERPL-MCNC: 9.4 MG/DL
CHLORIDE SERPL-SCNC: 102 MMOL/L
CHOLEST SERPL-MCNC: 212 MG/DL
CO2 SERPL-SCNC: 26 MMOL/L
COLOR: YELLOW
COLOR: YELLOW
CREAT SERPL-MCNC: 0.97 MG/DL
EOSINOPHIL # BLD AUTO: 0.26 K/UL
EOSINOPHIL NFR BLD AUTO: 4.3 %
ESTIMATED AVERAGE GLUCOSE: 134 MG/DL
GLUCOSE QUALITATIVE U: NEGATIVE
GLUCOSE QUALITATIVE U: NEGATIVE
GLUCOSE SERPL-MCNC: 99 MG/DL
HBA1C MFR BLD HPLC: 6.3 %
HCT VFR BLD CALC: 36.2 %
HDLC SERPL-MCNC: 41 MG/DL
HGB BLD-MCNC: 11.5 G/DL
HYALINE CASTS: 6 /LPF
IMM GRANULOCYTES NFR BLD AUTO: 0 %
KETONES URINE: NEGATIVE
KETONES URINE: NEGATIVE
LDLC SERPL CALC-MCNC: 134 MG/DL
LEUKOCYTE ESTERASE URINE: ABNORMAL
LEUKOCYTE ESTERASE URINE: ABNORMAL
LYMPHOCYTES # BLD AUTO: 2.62 K/UL
LYMPHOCYTES NFR BLD AUTO: 43.6 %
MAN DIFF?: NORMAL
MCHC RBC-ENTMCNC: 28.8 PG
MCHC RBC-ENTMCNC: 31.8 GM/DL
MCV RBC AUTO: 90.5 FL
MICROSCOPIC-UA: NORMAL
MONOCYTES # BLD AUTO: 0.35 K/UL
MONOCYTES NFR BLD AUTO: 5.8 %
NEUTROPHILS # BLD AUTO: 2.7 K/UL
NEUTROPHILS NFR BLD AUTO: 45 %
NITRITE URINE: NEGATIVE
NITRITE URINE: NEGATIVE
NONHDLC SERPL-MCNC: 171 MG/DL
PH URINE: 5.5
PH URINE: 5.5
PLATELET # BLD AUTO: 222 K/UL
POTASSIUM SERPL-SCNC: 3.6 MMOL/L
PROT SERPL-MCNC: 6.9 G/DL
PROTEIN URINE: NORMAL
PROTEIN URINE: NORMAL
RBC # BLD: 4 M/UL
RBC # FLD: 13.9 %
RED BLOOD CELLS URINE: 9 /HPF
SODIUM SERPL-SCNC: 141 MMOL/L
SPECIFIC GRAVITY URINE: 1.02
SPECIFIC GRAVITY URINE: 1.02
SQUAMOUS EPITHELIAL CELLS: 1 /HPF
TRIGL SERPL-MCNC: 185 MG/DL
TSH SERPL-ACNC: 3.16 UIU/ML
UROBILINOGEN URINE: NORMAL
UROBILINOGEN URINE: NORMAL
VIT B12 SERPL-MCNC: 677 PG/ML
WBC # FLD AUTO: 6.01 K/UL
WHITE BLOOD CELLS URINE: 86 /HPF

## 2022-01-12 ENCOUNTER — NON-APPOINTMENT (OUTPATIENT)
Age: 72
End: 2022-01-12

## 2022-02-02 ENCOUNTER — APPOINTMENT (OUTPATIENT)
Dept: INTERNAL MEDICINE | Facility: CLINIC | Age: 72
End: 2022-02-02
Payer: MEDICARE

## 2022-02-02 VITALS
OXYGEN SATURATION: 97 % | DIASTOLIC BLOOD PRESSURE: 73 MMHG | BODY MASS INDEX: 28.16 KG/M2 | WEIGHT: 153 LBS | TEMPERATURE: 97.3 F | HEIGHT: 62 IN | SYSTOLIC BLOOD PRESSURE: 152 MMHG | HEART RATE: 64 BPM

## 2022-02-02 DIAGNOSIS — R68.89 OTHER GENERAL SYMPTOMS AND SIGNS: ICD-10-CM

## 2022-02-02 PROCEDURE — 99213 OFFICE O/P EST LOW 20 MIN: CPT

## 2022-02-02 RX ORDER — FLUTICASONE PROPIONATE 50 UG/1
50 SPRAY, METERED NASAL
Qty: 1 | Refills: 4 | Status: ACTIVE | COMMUNITY
Start: 2022-02-02 | End: 1900-01-01

## 2022-02-02 RX ORDER — FAMOTIDINE 40 MG/1
40 TABLET, FILM COATED ORAL
Qty: 90 | Refills: 3 | Status: ACTIVE | COMMUNITY
Start: 2021-02-25 | End: 1900-01-01

## 2022-02-04 NOTE — ASSESSMENT
[FreeTextEntry1] : No red flag symptoms, symptoms may be secondary to reflux or postnasal drip, advised Flonase and H2 receptor blocker.  If no improvement to see ENT name provided

## 2022-02-04 NOTE — HISTORY OF PRESENT ILLNESS
[FreeTextEntry1] : Patient presents to the office for chronic throat clearing [de-identified] : Has throat clearing, clear mucus, denies any difficulty swallowing dysphagia hoarseness\par Denies any reflux\par Denies any sneezing runny nose

## 2023-01-20 ENCOUNTER — APPOINTMENT (OUTPATIENT)
Dept: INTERNAL MEDICINE | Facility: CLINIC | Age: 73
End: 2023-01-20
Payer: MEDICARE

## 2023-01-20 ENCOUNTER — NON-APPOINTMENT (OUTPATIENT)
Age: 73
End: 2023-01-20

## 2023-01-20 VITALS
HEART RATE: 87 BPM | OXYGEN SATURATION: 98 % | DIASTOLIC BLOOD PRESSURE: 88 MMHG | HEIGHT: 61.02 IN | TEMPERATURE: 97.6 F | SYSTOLIC BLOOD PRESSURE: 144 MMHG | WEIGHT: 153 LBS | BODY MASS INDEX: 28.89 KG/M2

## 2023-01-20 PROCEDURE — 36415 COLL VENOUS BLD VENIPUNCTURE: CPT

## 2023-01-20 PROCEDURE — G0439: CPT

## 2023-01-22 NOTE — HEALTH RISK ASSESSMENT
[Good] : ~his/her~  mood as  good [Never] : Never [No] : In the past 12 months have you used drugs other than those required for medical reasons? No [No falls in past year] : Patient reported no falls in the past year [0] : 2) Feeling down, depressed, or hopeless: Not at all (0) [Feels Safe at Home] : Feels safe at home [Fully functional (bathing, dressing, toileting, transferring, walking, feeding)] : Fully functional (bathing, dressing, toileting, transferring, walking, feeding) [Fully functional (using the telephone, shopping, preparing meals, housekeeping, doing laundry, using] : Fully functional and needs no help or supervision to perform IADLs (using the telephone, shopping, preparing meals, housekeeping, doing laundry, using transportation, managing medications and managing finances) [Smoke Detector] : smoke detector [Carbon Monoxide Detector] : carbon monoxide detector [Safety elements used in home] : safety elements used in home [Seat Belt] :  uses seat belt [Sunscreen] : uses sunscreen [FreeTextEntry1] : Health Maintenance [Change in mental status noted] : No change in mental status noted [Language] : denies difficulty with language [Behavior] : denies difficulty with behavior [Learning/Retaining New Information] : denies difficulty learning/retaining new information [Handling Complex Tasks] : denies difficulty handling complex tasks [Reasoning] : denies difficulty with reasoning [Spatial Ability and Orientation] : denies difficulty with spatial ability and orientation [Reports changes in hearing] : Reports no changes in hearing [Reports changes in vision] : Reports no changes in vision [Reports normal functional visual acuity (ie: able to read med bottle)] : Reports poor functional visual acuity.  [Reports changes in dental health] : Reports no changes in dental health [Guns at Home] : no guns at home [Travel to Developing Areas] : does not  travel to developing areas [TB Exposure] : is not being exposed to tuberculosis [Caregiver Concerns] : does not have caregiver concerns [MammogramComments] : ordered [ColonoscopyDate] : 2/2019

## 2023-01-22 NOTE — ASSESSMENT
[FreeTextEntry1] : Annual Wellness Visit\par -Blood work done today\par -BP is stable. Continue current management\par -Check A1c, lipid panel and Vitamin levels\par -Advise follow-up mammogram bone density, medication refill sent.  Check CBC levels for anemia has had colonoscopy consider GI consultation if persistent iron deficiency anemia\par -\par -RTO in 6 months.

## 2023-01-22 NOTE — ADDENDUM
[FreeTextEntry1] : I, Mauro Jasso, acted as a scribe on behalf of Dr. Omero Lynn MD, on 01/20/2023. \par \par All medical entries made by the scribe were at my, Dr. Omero Lynn MD, direction and personally dictated by me on 01/20/2023. I have reviewed the chart and agree that the record accurately reflects my personal performance of the history, physical exam, assessment and plan. I have also personally directed, reviewed, and agreed with the chart.

## 2023-01-22 NOTE — HISTORY OF PRESENT ILLNESS
[FreeTextEntry1] : Patient presents for annual wellness visit. [de-identified] : Patient presents for Medicare annual wellness visit\par Feels well did find to have anemia while in [place]\par Denies any chest pain chest tightness shortness of breath orthopnea paroxysmal nocturnal dyspnea\par Has follow-up for eye exam while in [place] denies any paresthesias

## 2023-01-25 LAB
25(OH)D3 SERPL-MCNC: 34.3 NG/ML
ALBUMIN SERPL ELPH-MCNC: 4.5 G/DL
ALP BLD-CCNC: 38 U/L
ALT SERPL-CCNC: 7 U/L
ANION GAP SERPL CALC-SCNC: 13 MMOL/L
APPEARANCE: CLEAR
AST SERPL-CCNC: 12 U/L
BACTERIA: NEGATIVE
BASOPHILS # BLD AUTO: 0.07 K/UL
BASOPHILS NFR BLD AUTO: 1.1 %
BILIRUB SERPL-MCNC: 0.9 MG/DL
BILIRUBIN URINE: NEGATIVE
BLOOD URINE: ABNORMAL
BUN SERPL-MCNC: 20 MG/DL
CALCIUM SERPL-MCNC: 9.7 MG/DL
CHLORIDE SERPL-SCNC: 101 MMOL/L
CHOLEST SERPL-MCNC: 238 MG/DL
CO2 SERPL-SCNC: 25 MMOL/L
COLOR: NORMAL
CREAT SERPL-MCNC: 0.86 MG/DL
EGFR: 71 ML/MIN/1.73M2
EOSINOPHIL # BLD AUTO: 0.27 K/UL
EOSINOPHIL NFR BLD AUTO: 4.3 %
ESTIMATED AVERAGE GLUCOSE: 134 MG/DL
FERRITIN SERPL-MCNC: 110 NG/ML
GLUCOSE QUALITATIVE U: NEGATIVE
GLUCOSE SERPL-MCNC: 96 MG/DL
HBA1C MFR BLD HPLC: 6.3 %
HCT VFR BLD CALC: 33 %
HDLC SERPL-MCNC: 43 MG/DL
HGB BLD-MCNC: 11.3 G/DL
HYALINE CASTS: 0 /LPF
IMM GRANULOCYTES NFR BLD AUTO: 0.2 %
IRON SATN MFR SERPL: 19 %
IRON SERPL-MCNC: 52 UG/DL
KETONES URINE: NEGATIVE
LDLC SERPL CALC-MCNC: 155 MG/DL
LEUKOCYTE ESTERASE URINE: NEGATIVE
LYMPHOCYTES # BLD AUTO: 2.22 K/UL
LYMPHOCYTES NFR BLD AUTO: 35.7 %
MAN DIFF?: NORMAL
MCHC RBC-ENTMCNC: 28.6 PG
MCHC RBC-ENTMCNC: 34.2 GM/DL
MCV RBC AUTO: 83.5 FL
MICROSCOPIC-UA: NORMAL
MONOCYTES # BLD AUTO: 0.26 K/UL
MONOCYTES NFR BLD AUTO: 4.2 %
NEUTROPHILS # BLD AUTO: 3.39 K/UL
NEUTROPHILS NFR BLD AUTO: 54.5 %
NITRITE URINE: NEGATIVE
NONHDLC SERPL-MCNC: 195 MG/DL
PH URINE: 5.5
PLATELET # BLD AUTO: 169 K/UL
POTASSIUM SERPL-SCNC: 3.8 MMOL/L
PROT SERPL-MCNC: 6.8 G/DL
PROTEIN URINE: NORMAL
RBC # BLD: 3.95 M/UL
RBC # FLD: 13.7 %
RED BLOOD CELLS URINE: 6 /HPF
SODIUM SERPL-SCNC: 139 MMOL/L
SPECIFIC GRAVITY URINE: 1.02
SQUAMOUS EPITHELIAL CELLS: 0 /HPF
TIBC SERPL-MCNC: 274 UG/DL
TRIGL SERPL-MCNC: 200 MG/DL
TSH SERPL-ACNC: 2.95 UIU/ML
UIBC SERPL-MCNC: 222 UG/DL
UROBILINOGEN URINE: NORMAL
VIT B12 SERPL-MCNC: 618 PG/ML
WBC # FLD AUTO: 6.22 K/UL
WHITE BLOOD CELLS URINE: 2 /HPF

## 2024-01-02 RX ORDER — BLOOD SUGAR DIAGNOSTIC
STRIP MISCELLANEOUS
Qty: 200 | Refills: 3 | Status: ACTIVE | COMMUNITY
Start: 2020-01-10 | End: 1900-01-01

## 2024-01-02 RX ORDER — ASPIRIN 81 MG/1
81 TABLET, CHEWABLE ORAL
Qty: 90 | Refills: 3 | Status: ACTIVE | COMMUNITY
Start: 2023-01-20 | End: 1900-01-01

## 2024-01-09 DIAGNOSIS — Z00.00 ENCOUNTER FOR GENERAL ADULT MEDICAL EXAMINATION W/OUT ABNORMAL FINDINGS: ICD-10-CM

## 2024-02-07 ENCOUNTER — APPOINTMENT (OUTPATIENT)
Dept: INTERNAL MEDICINE | Facility: CLINIC | Age: 74
End: 2024-02-07
Payer: MEDICARE

## 2024-02-07 ENCOUNTER — NON-APPOINTMENT (OUTPATIENT)
Age: 74
End: 2024-02-07

## 2024-02-07 ENCOUNTER — LABORATORY RESULT (OUTPATIENT)
Age: 74
End: 2024-02-07

## 2024-02-07 VITALS
HEIGHT: 60 IN | HEART RATE: 60 BPM | SYSTOLIC BLOOD PRESSURE: 152 MMHG | OXYGEN SATURATION: 98 % | DIASTOLIC BLOOD PRESSURE: 76 MMHG | WEIGHT: 156 LBS | BODY MASS INDEX: 30.63 KG/M2

## 2024-02-07 DIAGNOSIS — E11.9 TYPE 2 DIABETES MELLITUS W/OUT COMPLICATIONS: ICD-10-CM

## 2024-02-07 DIAGNOSIS — D64.9 ANEMIA, UNSPECIFIED: ICD-10-CM

## 2024-02-07 DIAGNOSIS — Z00.00 ENCOUNTER FOR GENERAL ADULT MEDICAL EXAMINATION W/OUT ABNORMAL FINDINGS: ICD-10-CM

## 2024-02-07 DIAGNOSIS — I10 ESSENTIAL (PRIMARY) HYPERTENSION: ICD-10-CM

## 2024-02-07 DIAGNOSIS — E78.5 HYPERLIPIDEMIA, UNSPECIFIED: ICD-10-CM

## 2024-02-07 PROCEDURE — G0439: CPT

## 2024-02-07 PROCEDURE — 36415 COLL VENOUS BLD VENIPUNCTURE: CPT

## 2024-02-08 VITALS — SYSTOLIC BLOOD PRESSURE: 122 MMHG | DIASTOLIC BLOOD PRESSURE: 80 MMHG

## 2024-02-08 NOTE — HISTORY OF PRESENT ILLNESS
[FreeTextEntry1] : Patient presents for annual wellness visit. [de-identified] : Patient is doing well overall and has not gotten sick since last visit. Pt has no acute concerns. Denies any CP, Chest tightness, SOB or LE edema. Denies any abdominal pain, urinary symptom or change in bowel habits. Denies any anxiety, depression or insomnia.  Was in the hospital in [facility] for anemia-had imaging done which was unrevealing-has gi consult scheduled

## 2024-02-08 NOTE — ADDENDUM
[FreeTextEntry1] : I, Mauro Jasso, acted as a scribe on behalf of Dr. Omero Lynn MD, on 02/08/2024.   All medical entries made by the scribe were at my, Dr. Omero Lynn MD, direction and personally dictated by me on 02/08/2024. I have reviewed the chart and agree that the record accurately reflects my personal performance of the history, physical exam, assessment and plan. I have also personally directed, reviewed, and agreed with the chart.

## 2024-02-08 NOTE — HEALTH RISK ASSESSMENT
[Good] : ~his/her~  mood as  good [No] : In the past 12 months have you used drugs other than those required for medical reasons? No [No falls in past year] : Patient reported no falls in the past year [0] : 2) Feeling down, depressed, or hopeless: Not at all (0) [Feels Safe at Home] : Feels safe at home [Fully functional (bathing, dressing, toileting, transferring, walking, feeding)] : Fully functional (bathing, dressing, toileting, transferring, walking, feeding) [Fully functional (using the telephone, shopping, preparing meals, housekeeping, doing laundry, using] : Fully functional and needs no help or supervision to perform IADLs (using the telephone, shopping, preparing meals, housekeeping, doing laundry, using transportation, managing medications and managing finances) [Smoke Detector] : smoke detector [Carbon Monoxide Detector] : carbon monoxide detector [Safety elements used in home] : safety elements used in home [Seat Belt] :  uses seat belt [Sunscreen] : uses sunscreen [Never] : Never [FreeTextEntry1] : Health Maintenance [Change in mental status noted] : No change in mental status noted [Language] : denies difficulty with language [Behavior] : denies difficulty with behavior [Learning/Retaining New Information] : denies difficulty learning/retaining new information [Handling Complex Tasks] : denies difficulty handling complex tasks [Reasoning] : denies difficulty with reasoning [Spatial Ability and Orientation] : denies difficulty with spatial ability and orientation [Reports changes in hearing] : Reports no changes in hearing [Reports changes in vision] : Reports no changes in vision [Reports normal functional visual acuity (ie: able to read med bottle)] : Reports poor functional visual acuity.  [Reports changes in dental health] : Reports no changes in dental health [Guns at Home] : no guns at home [Travel to Developing Areas] : does not  travel to developing areas [TB Exposure] : is not being exposed to tuberculosis [Caregiver Concerns] : does not have caregiver concerns [ColonoscopyDate] : 2/2019

## 2024-02-08 NOTE — ASSESSMENT
[FreeTextEntry1] : Annual Wellness Visit -Repeat BP is stable. Continue current management. -Check A1c, lipid panel and Vitamin levels -Check ferritin and iron levels for anemia. Ferrous sulfate refills provided. -Continue with Healthy diet. -RTO in 6 months.

## 2024-02-12 LAB
25(OH)D3 SERPL-MCNC: 39.9 NG/ML
ALBUMIN SERPL ELPH-MCNC: 4.3 G/DL
ALP BLD-CCNC: 34 U/L
ALT SERPL-CCNC: 6 U/L
ANION GAP SERPL CALC-SCNC: 10 MMOL/L
APPEARANCE: CLEAR
AST SERPL-CCNC: 12 U/L
BILIRUB SERPL-MCNC: 0.7 MG/DL
BILIRUBIN URINE: NEGATIVE
BLOOD URINE: ABNORMAL
BUN SERPL-MCNC: 24 MG/DL
CALCIUM SERPL-MCNC: 9.2 MG/DL
CHLORIDE SERPL-SCNC: 104 MMOL/L
CHOLEST SERPL-MCNC: 215 MG/DL
CO2 SERPL-SCNC: 26 MMOL/L
COLOR: YELLOW
CREAT SERPL-MCNC: 0.86 MG/DL
EGFR: 70 ML/MIN/1.73M2
ESTIMATED AVERAGE GLUCOSE: 128 MG/DL
FERRITIN SERPL-MCNC: 86 NG/ML
FOLATE SERPL-MCNC: >20 NG/ML
GLUCOSE QUALITATIVE U: NEGATIVE MG/DL
GLUCOSE SERPL-MCNC: 124 MG/DL
HBA1C MFR BLD HPLC: 6.1 %
HCT VFR BLD CALC: 32.2 %
HDLC SERPL-MCNC: 40 MG/DL
HGB BLD-MCNC: 10.8 G/DL
IRON SATN MFR SERPL: 17 %
IRON SERPL-MCNC: 47 UG/DL
KETONES URINE: NEGATIVE MG/DL
LDLC SERPL CALC-MCNC: 136 MG/DL
LEUKOCYTE ESTERASE URINE: ABNORMAL
MCHC RBC-ENTMCNC: 28.3 PG
MCHC RBC-ENTMCNC: 33.5 GM/DL
MCV RBC AUTO: 84.3 FL
NITRITE URINE: NEGATIVE
NONHDLC SERPL-MCNC: 176 MG/DL
PH URINE: 5.5
PLATELET # BLD AUTO: 181 K/UL
POTASSIUM SERPL-SCNC: 4.2 MMOL/L
PROT SERPL-MCNC: 6.8 G/DL
PROTEIN URINE: NORMAL MG/DL
RBC # BLD: 3.82 M/UL
RBC # FLD: 14.1 %
SODIUM SERPL-SCNC: 140 MMOL/L
SPECIFIC GRAVITY URINE: 1.02
TIBC SERPL-MCNC: 271 UG/DL
TRIGL SERPL-MCNC: 219 MG/DL
TSH SERPL-ACNC: 2.69 UIU/ML
UIBC SERPL-MCNC: 224 UG/DL
UROBILINOGEN URINE: 0.2 MG/DL
VIT B12 SERPL-MCNC: 713 PG/ML
WBC # FLD AUTO: 5.22 K/UL

## 2024-02-12 RX ORDER — ALENDRONATE SODIUM 70 MG/1
70 TABLET ORAL
Qty: 12 | Refills: 3 | Status: ACTIVE | COMMUNITY
Start: 2021-03-15 | End: 1900-01-01

## 2024-02-12 RX ORDER — CHLORHEXIDINE GLUCONATE 4 %
325 (65 FE) LIQUID (ML) TOPICAL
Qty: 180 | Refills: 1 | Status: ACTIVE | COMMUNITY
Start: 2023-01-20 | End: 1900-01-01

## 2024-03-18 ENCOUNTER — RX RENEWAL (OUTPATIENT)
Age: 74
End: 2024-03-18

## 2024-06-17 ENCOUNTER — RESULT REVIEW (OUTPATIENT)
Age: 74
End: 2024-06-17

## 2024-06-17 ENCOUNTER — TRANSCRIPTION ENCOUNTER (OUTPATIENT)
Age: 74
End: 2024-06-17

## 2024-06-17 ENCOUNTER — INPATIENT (INPATIENT)
Facility: HOSPITAL | Age: 74
LOS: 3 days | Discharge: HOME HEALTH | DRG: 829 | End: 2024-06-21
Attending: NEUROLOGICAL SURGERY | Admitting: NEUROLOGICAL SURGERY
Payer: MEDICARE

## 2024-06-17 VITALS
OXYGEN SATURATION: 100 % | RESPIRATION RATE: 18 BRPM | HEIGHT: 64 IN | DIASTOLIC BLOOD PRESSURE: 79 MMHG | HEART RATE: 57 BPM | TEMPERATURE: 97 F | WEIGHT: 154.98 LBS | SYSTOLIC BLOOD PRESSURE: 167 MMHG

## 2024-06-17 DIAGNOSIS — Z98.890 OTHER SPECIFIED POSTPROCEDURAL STATES: Chronic | ICD-10-CM

## 2024-06-17 DIAGNOSIS — E23.7 DISORDER OF PITUITARY GLAND, UNSPECIFIED: ICD-10-CM

## 2024-06-17 LAB
ACTH SER-ACNC: 14.9 PG/ML — SIGNIFICANT CHANGE UP (ref 7.2–63.3)
ADD ON TEST-SPECIMEN IN LAB: SIGNIFICANT CHANGE UP
ALBUMIN SERPL ELPH-MCNC: 4.3 G/DL — SIGNIFICANT CHANGE UP (ref 3.3–5)
ALP SERPL-CCNC: 32 U/L — LOW (ref 40–120)
ALT FLD-CCNC: 10 U/L — SIGNIFICANT CHANGE UP (ref 10–45)
ANION GAP SERPL CALC-SCNC: 6 MMOL/L — SIGNIFICANT CHANGE UP (ref 5–17)
APTT BLD: 28 SEC — SIGNIFICANT CHANGE UP (ref 24.5–35.6)
AST SERPL-CCNC: 13 U/L — SIGNIFICANT CHANGE UP (ref 10–40)
BASOPHILS # BLD AUTO: 0.09 K/UL — SIGNIFICANT CHANGE UP (ref 0–0.2)
BASOPHILS NFR BLD AUTO: 1.2 % — SIGNIFICANT CHANGE UP (ref 0–2)
BILIRUB SERPL-MCNC: 1.2 MG/DL — SIGNIFICANT CHANGE UP (ref 0.2–1.2)
BLD GP AB SCN SERPL QL: NEGATIVE — SIGNIFICANT CHANGE UP
BUN SERPL-MCNC: 28 MG/DL — HIGH (ref 7–23)
CALCIUM SERPL-MCNC: 9.2 MG/DL — SIGNIFICANT CHANGE UP (ref 8.4–10.5)
CHLORIDE SERPL-SCNC: 101 MMOL/L — SIGNIFICANT CHANGE UP (ref 96–108)
CO2 SERPL-SCNC: 30 MMOL/L — SIGNIFICANT CHANGE UP (ref 22–31)
CREAT SERPL-MCNC: 1.05 MG/DL — SIGNIFICANT CHANGE UP (ref 0.5–1.3)
EGFR: 55 ML/MIN/1.73M2 — LOW
EOSINOPHIL # BLD AUTO: 0.29 K/UL — SIGNIFICANT CHANGE UP (ref 0–0.5)
EOSINOPHIL NFR BLD AUTO: 4 % — SIGNIFICANT CHANGE UP (ref 0–6)
FSH SERPL-MCNC: 5.88 IU/L — SIGNIFICANT CHANGE UP
GH SERPL-MCNC: 0.37 NG/ML — SIGNIFICANT CHANGE UP (ref 0.12–9.88)
GLUCOSE BLDC GLUCOMTR-MCNC: 111 MG/DL — HIGH (ref 70–99)
GLUCOSE BLDC GLUCOMTR-MCNC: 192 MG/DL — HIGH (ref 70–99)
GLUCOSE BLDC GLUCOMTR-MCNC: 195 MG/DL — HIGH (ref 70–99)
GLUCOSE BLDC GLUCOMTR-MCNC: 238 MG/DL — HIGH (ref 70–99)
GLUCOSE SERPL-MCNC: 107 MG/DL — HIGH (ref 70–99)
HCT VFR BLD CALC: 34.2 % — LOW (ref 34.5–45)
HGB BLD-MCNC: 11.2 G/DL — LOW (ref 11.5–15.5)
IMM GRANULOCYTES NFR BLD AUTO: 0.4 % — SIGNIFICANT CHANGE UP (ref 0–0.9)
INR BLD: 0.94 — SIGNIFICANT CHANGE UP (ref 0.85–1.18)
LH SERPL-ACNC: 1.28 IU/L — SIGNIFICANT CHANGE UP
LYMPHOCYTES # BLD AUTO: 2.47 K/UL — SIGNIFICANT CHANGE UP (ref 1–3.3)
LYMPHOCYTES # BLD AUTO: 33.9 % — SIGNIFICANT CHANGE UP (ref 13–44)
MCHC RBC-ENTMCNC: 29.5 PG — SIGNIFICANT CHANGE UP (ref 27–34)
MCHC RBC-ENTMCNC: 32.7 GM/DL — SIGNIFICANT CHANGE UP (ref 32–36)
MCV RBC AUTO: 90 FL — SIGNIFICANT CHANGE UP (ref 80–100)
MONOCYTES # BLD AUTO: 0.39 K/UL — SIGNIFICANT CHANGE UP (ref 0–0.9)
MONOCYTES NFR BLD AUTO: 5.3 % — SIGNIFICANT CHANGE UP (ref 2–14)
NEUTROPHILS # BLD AUTO: 4.02 K/UL — SIGNIFICANT CHANGE UP (ref 1.8–7.4)
NEUTROPHILS NFR BLD AUTO: 55.2 % — SIGNIFICANT CHANGE UP (ref 43–77)
NRBC # BLD: 0 /100 WBCS — SIGNIFICANT CHANGE UP (ref 0–0)
PLATELET # BLD AUTO: 170 K/UL — SIGNIFICANT CHANGE UP (ref 150–400)
POTASSIUM SERPL-MCNC: 4.6 MMOL/L — SIGNIFICANT CHANGE UP (ref 3.5–5.3)
POTASSIUM SERPL-SCNC: 4.6 MMOL/L — SIGNIFICANT CHANGE UP (ref 3.5–5.3)
PROLACTIN SERPL-MCNC: 38.73 NG/ML — HIGH (ref 3.4–24.1)
PROT SERPL-MCNC: 6.5 G/DL — SIGNIFICANT CHANGE UP (ref 6–8.3)
PROTHROM AB SERPL-ACNC: 10.7 SEC — SIGNIFICANT CHANGE UP (ref 9.5–13)
RBC # BLD: 3.8 M/UL — SIGNIFICANT CHANGE UP (ref 3.8–5.2)
RBC # FLD: 15.9 % — HIGH (ref 10.3–14.5)
RH IG SCN BLD-IMP: POSITIVE — SIGNIFICANT CHANGE UP
RH IG SCN BLD-IMP: POSITIVE — SIGNIFICANT CHANGE UP
SODIUM SERPL-SCNC: 137 MMOL/L — SIGNIFICANT CHANGE UP (ref 135–145)
TSH SERPL-MCNC: 0.28 UIU/ML — SIGNIFICANT CHANGE UP (ref 0.27–4.2)
WBC # BLD: 7.29 K/UL — SIGNIFICANT CHANGE UP (ref 3.8–10.5)
WBC # FLD AUTO: 7.29 K/UL — SIGNIFICANT CHANGE UP (ref 3.8–10.5)

## 2024-06-17 PROCEDURE — 70553 MRI BRAIN STEM W/O & W/DYE: CPT | Mod: 26

## 2024-06-17 PROCEDURE — 99223 1ST HOSP IP/OBS HIGH 75: CPT

## 2024-06-17 PROCEDURE — 88360 TUMOR IMMUNOHISTOCHEM/MANUAL: CPT | Mod: 26

## 2024-06-17 PROCEDURE — 88342 IMHCHEM/IMCYTCHM 1ST ANTB: CPT | Mod: 26

## 2024-06-17 PROCEDURE — 88304 TISSUE EXAM BY PATHOLOGIST: CPT | Mod: 26

## 2024-06-17 PROCEDURE — 99285 EMERGENCY DEPT VISIT HI MDM: CPT

## 2024-06-17 PROCEDURE — 71045 X-RAY EXAM CHEST 1 VIEW: CPT | Mod: 26

## 2024-06-17 PROCEDURE — 88334 PATH CONSLTJ SURG CYTO XM EA: CPT | Mod: 26,59

## 2024-06-17 PROCEDURE — 88331 PATH CONSLTJ SURG 1 BLK 1SPC: CPT | Mod: 26

## 2024-06-17 PROCEDURE — 88307 TISSUE EXAM BY PATHOLOGIST: CPT | Mod: 26

## 2024-06-17 PROCEDURE — 88341 IMHCHEM/IMCYTCHM EA ADD ANTB: CPT | Mod: 26

## 2024-06-17 PROCEDURE — 88311 DECALCIFY TISSUE: CPT | Mod: 26

## 2024-06-17 RX ORDER — ACETAMINOPHEN 325 MG
650 TABLET ORAL EVERY 6 HOURS
Refills: 0 | Status: DISCONTINUED | OUTPATIENT
Start: 2024-06-17 | End: 2024-06-18

## 2024-06-17 RX ORDER — SENNOSIDES 8.6 MG
2 TABLET ORAL AT BEDTIME
Refills: 0 | Status: DISCONTINUED | OUTPATIENT
Start: 2024-06-17 | End: 2024-06-18

## 2024-06-17 RX ORDER — POLYETHYLENE GLYCOL 3350 1 G/G
17 POWDER ORAL DAILY
Refills: 0 | Status: DISCONTINUED | OUTPATIENT
Start: 2024-06-17 | End: 2024-06-18

## 2024-06-17 RX ORDER — INSULIN LISPRO 100 [IU]/ML
INJECTION, SOLUTION SUBCUTANEOUS
Refills: 0 | Status: DISCONTINUED | OUTPATIENT
Start: 2024-06-17 | End: 2024-06-18

## 2024-06-17 RX ORDER — POVIDONE-IODINE
1 FLAKES (GRAM) MISCELLANEOUS ONCE
Refills: 0 | Status: COMPLETED | OUTPATIENT
Start: 2024-06-17 | End: 2024-06-18

## 2024-06-17 RX ORDER — HYDRALAZINE HYDROCHLORIDE 50 MG/1
10 TABLET ORAL ONCE
Refills: 0 | Status: COMPLETED | OUTPATIENT
Start: 2024-06-17 | End: 2024-06-17

## 2024-06-17 RX ORDER — DEXAMETHASONE 1 MG/1
4 TABLET ORAL EVERY 6 HOURS
Refills: 0 | Status: DISCONTINUED | OUTPATIENT
Start: 2024-06-17 | End: 2024-06-18

## 2024-06-17 RX ORDER — LEVOTHYROXINE SODIUM 25 MCG
62.5 TABLET ORAL DAILY
Refills: 0 | Status: DISCONTINUED | OUTPATIENT
Start: 2024-06-17 | End: 2024-06-18

## 2024-06-17 RX ORDER — ACETAMINOPHEN 325 MG
1000 TABLET ORAL ONCE
Refills: 0 | Status: COMPLETED | OUTPATIENT
Start: 2024-06-18 | End: 2024-06-18

## 2024-06-17 RX ORDER — PANTOPRAZOLE SODIUM 40 MG/10ML
40 INJECTION, POWDER, FOR SOLUTION INTRAVENOUS
Refills: 0 | Status: DISCONTINUED | OUTPATIENT
Start: 2024-06-17 | End: 2024-06-18

## 2024-06-17 RX ORDER — APREPITANT 125MG-80MG
40 KIT ORAL ONCE
Refills: 0 | Status: COMPLETED | OUTPATIENT
Start: 2024-06-18 | End: 2024-06-18

## 2024-06-17 RX ORDER — SODIUM CHLORIDE 0.9 % (FLUSH) 0.9 %
1000 SYRINGE (ML) INJECTION
Refills: 0 | Status: DISCONTINUED | OUTPATIENT
Start: 2024-06-17 | End: 2024-06-18

## 2024-06-17 RX ADMIN — DEXAMETHASONE 4 MILLIGRAM(S): 1 TABLET ORAL at 18:33

## 2024-06-17 RX ADMIN — INSULIN LISPRO 2: 100 INJECTION, SOLUTION SUBCUTANEOUS at 18:41

## 2024-06-17 RX ADMIN — PANTOPRAZOLE SODIUM 40 MILLIGRAM(S): 40 INJECTION, POWDER, FOR SOLUTION INTRAVENOUS at 09:41

## 2024-06-17 RX ADMIN — Medication 2 TABLET(S): at 21:54

## 2024-06-17 RX ADMIN — INSULIN LISPRO 4: 100 INJECTION, SOLUTION SUBCUTANEOUS at 22:00

## 2024-06-17 RX ADMIN — DEXAMETHASONE 4 MILLIGRAM(S): 1 TABLET ORAL at 09:41

## 2024-06-17 RX ADMIN — DEXAMETHASONE 4 MILLIGRAM(S): 1 TABLET ORAL at 23:23

## 2024-06-18 LAB
A1C WITH ESTIMATED AVERAGE GLUCOSE RESULT: 5.4 % — SIGNIFICANT CHANGE UP (ref 4–5.6)
ANION GAP SERPL CALC-SCNC: 10 MMOL/L — SIGNIFICANT CHANGE UP (ref 5–17)
ANION GAP SERPL CALC-SCNC: 12 MMOL/L — SIGNIFICANT CHANGE UP (ref 5–17)
APTT BLD: 24.8 SEC — SIGNIFICANT CHANGE UP (ref 24.5–35.6)
BUN SERPL-MCNC: 27 MG/DL — HIGH (ref 7–23)
BUN SERPL-MCNC: 28 MG/DL — HIGH (ref 7–23)
CALCIUM SERPL-MCNC: 8.6 MG/DL — SIGNIFICANT CHANGE UP (ref 8.4–10.5)
CALCIUM SERPL-MCNC: 9.2 MG/DL — SIGNIFICANT CHANGE UP (ref 8.4–10.5)
CHLORIDE SERPL-SCNC: 105 MMOL/L — SIGNIFICANT CHANGE UP (ref 96–108)
CHLORIDE SERPL-SCNC: 105 MMOL/L — SIGNIFICANT CHANGE UP (ref 96–108)
CO2 SERPL-SCNC: 23 MMOL/L — SIGNIFICANT CHANGE UP (ref 22–31)
CO2 SERPL-SCNC: 25 MMOL/L — SIGNIFICANT CHANGE UP (ref 22–31)
CREAT SERPL-MCNC: 0.75 MG/DL — SIGNIFICANT CHANGE UP (ref 0.5–1.3)
CREAT SERPL-MCNC: 0.92 MG/DL — SIGNIFICANT CHANGE UP (ref 0.5–1.3)
EGFR: 65 ML/MIN/1.73M2 — SIGNIFICANT CHANGE UP
EGFR: 83 ML/MIN/1.73M2 — SIGNIFICANT CHANGE UP
ESTIMATED AVERAGE GLUCOSE: 108 MG/DL — SIGNIFICANT CHANGE UP (ref 68–114)
GLUCOSE BLDC GLUCOMTR-MCNC: 179 MG/DL — HIGH (ref 70–99)
GLUCOSE BLDC GLUCOMTR-MCNC: 185 MG/DL — HIGH (ref 70–99)
GLUCOSE BLDC GLUCOMTR-MCNC: 282 MG/DL — HIGH (ref 70–99)
GLUCOSE SERPL-MCNC: 187 MG/DL — HIGH (ref 70–99)
GLUCOSE SERPL-MCNC: 232 MG/DL — HIGH (ref 70–99)
HCT VFR BLD CALC: 30.3 % — LOW (ref 34.5–45)
HCT VFR BLD CALC: 31.8 % — LOW (ref 34.5–45)
HGB BLD-MCNC: 10.2 G/DL — LOW (ref 11.5–15.5)
HGB BLD-MCNC: 10.5 G/DL — LOW (ref 11.5–15.5)
INR BLD: 1.05 — SIGNIFICANT CHANGE UP (ref 0.85–1.18)
INSULIN-LIKE GROWTH FACTOR 1 INTERPRETATION: SIGNIFICANT CHANGE UP
INSULIN-LIKE GROWTH FACTOR 1: 51 NG/ML — SIGNIFICANT CHANGE UP (ref 20–218)
MAGNESIUM SERPL-MCNC: 2 MG/DL — SIGNIFICANT CHANGE UP (ref 1.6–2.6)
MAGNESIUM SERPL-MCNC: 2.1 MG/DL — SIGNIFICANT CHANGE UP (ref 1.6–2.6)
MCHC RBC-ENTMCNC: 29.1 PG — SIGNIFICANT CHANGE UP (ref 27–34)
MCHC RBC-ENTMCNC: 29.1 PG — SIGNIFICANT CHANGE UP (ref 27–34)
MCHC RBC-ENTMCNC: 33 GM/DL — SIGNIFICANT CHANGE UP (ref 32–36)
MCHC RBC-ENTMCNC: 33.7 GM/DL — SIGNIFICANT CHANGE UP (ref 32–36)
MCV RBC AUTO: 86.3 FL — SIGNIFICANT CHANGE UP (ref 80–100)
MCV RBC AUTO: 88.1 FL — SIGNIFICANT CHANGE UP (ref 80–100)
NRBC # BLD: 0 /100 WBCS — SIGNIFICANT CHANGE UP (ref 0–0)
NRBC # BLD: 0 /100 WBCS — SIGNIFICANT CHANGE UP (ref 0–0)
PHOSPHATE SERPL-MCNC: 3 MG/DL — SIGNIFICANT CHANGE UP (ref 2.5–4.5)
PHOSPHATE SERPL-MCNC: 3.6 MG/DL — SIGNIFICANT CHANGE UP (ref 2.5–4.5)
PLATELET # BLD AUTO: 129 K/UL — LOW (ref 150–400)
PLATELET # BLD AUTO: 174 K/UL — SIGNIFICANT CHANGE UP (ref 150–400)
POTASSIUM SERPL-MCNC: 3.6 MMOL/L — SIGNIFICANT CHANGE UP (ref 3.5–5.3)
POTASSIUM SERPL-MCNC: 4.5 MMOL/L — SIGNIFICANT CHANGE UP (ref 3.5–5.3)
POTASSIUM SERPL-SCNC: 3.6 MMOL/L — SIGNIFICANT CHANGE UP (ref 3.5–5.3)
POTASSIUM SERPL-SCNC: 4.5 MMOL/L — SIGNIFICANT CHANGE UP (ref 3.5–5.3)
PROTHROM AB SERPL-ACNC: 11.9 SEC — SIGNIFICANT CHANGE UP (ref 9.5–13)
RBC # BLD: 3.51 M/UL — LOW (ref 3.8–5.2)
RBC # BLD: 3.61 M/UL — LOW (ref 3.8–5.2)
RBC # FLD: 15.7 % — HIGH (ref 10.3–14.5)
RBC # FLD: 15.7 % — HIGH (ref 10.3–14.5)
SODIUM SERPL-SCNC: 140 MMOL/L — SIGNIFICANT CHANGE UP (ref 135–145)
SODIUM SERPL-SCNC: 140 MMOL/L — SIGNIFICANT CHANGE UP (ref 135–145)
T4 FREE SERPL-MCNC: 0.98 NG/DL — SIGNIFICANT CHANGE UP (ref 0.93–1.7)
WBC # BLD: 7.4 K/UL — SIGNIFICANT CHANGE UP (ref 3.8–10.5)
WBC # BLD: 7.96 K/UL — SIGNIFICANT CHANGE UP (ref 3.8–10.5)
WBC # FLD AUTO: 7.4 K/UL — SIGNIFICANT CHANGE UP (ref 3.8–10.5)
WBC # FLD AUTO: 7.96 K/UL — SIGNIFICANT CHANGE UP (ref 3.8–10.5)

## 2024-06-18 PROCEDURE — 70486 CT MAXILLOFACIAL W/O DYE: CPT | Mod: 26

## 2024-06-18 PROCEDURE — 62165 REMOVE PITUIT TUMOR W/SCOPE: CPT | Mod: 62

## 2024-06-18 PROCEDURE — 99291 CRITICAL CARE FIRST HOUR: CPT

## 2024-06-18 PROCEDURE — 61782 SCAN PROC CRANIAL EXTRA: CPT

## 2024-06-18 PROCEDURE — 99292 CRITICAL CARE ADDL 30 MIN: CPT

## 2024-06-18 PROCEDURE — 62165 REMOVE PITUIT TUMOR W/SCOPE: CPT | Mod: 80

## 2024-06-18 DEVICE — SURGCEL 4 X 8": Type: IMPLANTABLE DEVICE | Status: FUNCTIONAL

## 2024-06-18 DEVICE — SURGIFOAM PAD 8CM X 12.5CM X 10MM (100): Type: IMPLANTABLE DEVICE | Status: FUNCTIONAL

## 2024-06-18 DEVICE — SURGIFLO HEMOSTATIC MATRIX KIT: Type: IMPLANTABLE DEVICE | Status: FUNCTIONAL

## 2024-06-18 DEVICE — DVC ADHERUS AUTOSPRAY W/ DURAL SEALANT EXT TIP: Type: IMPLANTABLE DEVICE | Status: FUNCTIONAL

## 2024-06-18 RX ORDER — HYDROMORPHONE HCL 0.2 MG/ML
0.5 INJECTION, SOLUTION INTRAVENOUS ONCE
Refills: 0 | Status: DISCONTINUED | OUTPATIENT
Start: 2024-06-18 | End: 2024-06-18

## 2024-06-18 RX ORDER — DEXTROSE MONOHYDRATE 100 MG/ML
125 INJECTION, SOLUTION INTRAVENOUS ONCE
Refills: 0 | Status: DISCONTINUED | OUTPATIENT
Start: 2024-06-18 | End: 2024-06-18

## 2024-06-18 RX ORDER — KETOROLAC TROMETHAMINE 30 MG/ML
15 INJECTION, SOLUTION INTRAMUSCULAR EVERY 6 HOURS
Refills: 0 | Status: DISCONTINUED | OUTPATIENT
Start: 2024-06-18 | End: 2024-06-18

## 2024-06-18 RX ORDER — KETOROLAC TROMETHAMINE 30 MG/ML
30 INJECTION, SOLUTION INTRAMUSCULAR EVERY 6 HOURS
Refills: 0 | Status: DISCONTINUED | OUTPATIENT
Start: 2024-06-18 | End: 2024-06-18

## 2024-06-18 RX ORDER — PANTOPRAZOLE SODIUM 40 MG/10ML
40 INJECTION, POWDER, FOR SOLUTION INTRAVENOUS DAILY
Refills: 0 | Status: DISCONTINUED | OUTPATIENT
Start: 2024-06-18 | End: 2024-06-18

## 2024-06-18 RX ORDER — ACETAMINOPHEN 325 MG
1000 TABLET ORAL EVERY 8 HOURS
Refills: 0 | Status: DISCONTINUED | OUTPATIENT
Start: 2024-06-18 | End: 2024-06-21

## 2024-06-18 RX ORDER — DEXTROSE MONOHYDRATE AND SODIUM CHLORIDE 5; .3 G/100ML; G/100ML
1000 INJECTION, SOLUTION INTRAVENOUS ONCE
Refills: 0 | Status: COMPLETED | OUTPATIENT
Start: 2024-06-18 | End: 2024-06-18

## 2024-06-18 RX ORDER — PANTOPRAZOLE SODIUM 40 MG/10ML
40 INJECTION, POWDER, FOR SOLUTION INTRAVENOUS
Refills: 0 | Status: DISCONTINUED | OUTPATIENT
Start: 2024-06-18 | End: 2024-06-21

## 2024-06-18 RX ORDER — LEVOTHYROXINE SODIUM 25 MCG
75 TABLET ORAL DAILY
Refills: 0 | Status: DISCONTINUED | OUTPATIENT
Start: 2024-06-18 | End: 2024-06-21

## 2024-06-18 RX ORDER — DEXTROSE 30 % IN WATER 30 %
25 VIAL (ML) INTRAVENOUS ONCE
Refills: 0 | Status: DISCONTINUED | OUTPATIENT
Start: 2024-06-18 | End: 2024-06-18

## 2024-06-18 RX ORDER — CEFTRIAXONE SODIUM 500 MG
2000 VIAL (EA) INJECTION EVERY 24 HOURS
Refills: 0 | Status: DISCONTINUED | OUTPATIENT
Start: 2024-06-18 | End: 2024-06-21

## 2024-06-18 RX ORDER — SENNOSIDES 8.6 MG
2 TABLET ORAL AT BEDTIME
Refills: 0 | Status: DISCONTINUED | OUTPATIENT
Start: 2024-06-18 | End: 2024-06-21

## 2024-06-18 RX ORDER — INSULIN LISPRO 100 [IU]/ML
INJECTION, SOLUTION SUBCUTANEOUS
Refills: 0 | Status: DISCONTINUED | OUTPATIENT
Start: 2024-06-18 | End: 2024-06-21

## 2024-06-18 RX ORDER — NICARDIPINE HYDROCHLORIDE 0.1 MG/ML
5 INJECTION, SOLUTION INTRAVENOUS
Qty: 40 | Refills: 0 | Status: DISCONTINUED | OUTPATIENT
Start: 2024-06-18 | End: 2024-06-19

## 2024-06-18 RX ORDER — NICARDIPINE HYDROCHLORIDE 0.1 MG/ML
5 INJECTION, SOLUTION INTRAVENOUS
Qty: 40 | Refills: 0 | Status: DISCONTINUED | OUTPATIENT
Start: 2024-06-18 | End: 2024-06-18

## 2024-06-18 RX ORDER — RACEPINEPHRINE HCL 2.25 %
2 SOLUTION, NON-ORAL TOPICAL ONCE
Refills: 0 | Status: DISCONTINUED | OUTPATIENT
Start: 2024-06-18 | End: 2024-06-18

## 2024-06-18 RX ORDER — SODIUM CHLORIDE 0.9 % (FLUSH) 0.9 %
1000 SYRINGE (ML) INJECTION
Refills: 0 | Status: DISCONTINUED | OUTPATIENT
Start: 2024-06-18 | End: 2024-06-19

## 2024-06-18 RX ORDER — GLUCAGON HYDROCHLORIDE 1 MG/ML
1 INJECTION, POWDER, FOR SOLUTION INTRAMUSCULAR; INTRAVENOUS; SUBCUTANEOUS ONCE
Refills: 0 | Status: DISCONTINUED | OUTPATIENT
Start: 2024-06-18 | End: 2024-06-18

## 2024-06-18 RX ORDER — DEXTROSE MONOHYDRATE AND SODIUM CHLORIDE 5; .3 G/100ML; G/100ML
1000 INJECTION, SOLUTION INTRAVENOUS
Refills: 0 | Status: DISCONTINUED | OUTPATIENT
Start: 2024-06-18 | End: 2024-06-18

## 2024-06-18 RX ORDER — LISINOPRIL 5 MG/1
10 TABLET ORAL DAILY
Refills: 0 | Status: DISCONTINUED | OUTPATIENT
Start: 2024-06-18 | End: 2024-06-21

## 2024-06-18 RX ORDER — HYDROCORTISONE 100 MG/60ML
100 SUSPENSION RECTAL EVERY 8 HOURS
Refills: 0 | Status: DISCONTINUED | OUTPATIENT
Start: 2024-06-18 | End: 2024-06-19

## 2024-06-18 RX ORDER — BISACODYL 5 MG
5 TABLET, DELAYED RELEASE (ENTERIC COATED) ORAL DAILY
Refills: 0 | Status: DISCONTINUED | OUTPATIENT
Start: 2024-06-18 | End: 2024-06-21

## 2024-06-18 RX ORDER — INSULIN GLARGINE 100 [IU]/ML
8 INJECTION, SOLUTION SUBCUTANEOUS AT BEDTIME
Refills: 0 | Status: DISCONTINUED | OUTPATIENT
Start: 2024-06-18 | End: 2024-06-21

## 2024-06-18 RX ORDER — DEXTROSE 30 % IN WATER 30 %
15 VIAL (ML) INTRAVENOUS ONCE
Refills: 0 | Status: DISCONTINUED | OUTPATIENT
Start: 2024-06-18 | End: 2024-06-18

## 2024-06-18 RX ORDER — DEXTROSE 30 % IN WATER 30 %
12.5 VIAL (ML) INTRAVENOUS ONCE
Refills: 0 | Status: DISCONTINUED | OUTPATIENT
Start: 2024-06-18 | End: 2024-06-18

## 2024-06-18 RX ORDER — POTASSIUM CHLORIDE 600 MG/1
40 TABLET, FILM COATED, EXTENDED RELEASE ORAL ONCE
Refills: 0 | Status: COMPLETED | OUTPATIENT
Start: 2024-06-18 | End: 2024-06-18

## 2024-06-18 RX ORDER — ONDANSETRON HYDROCHLORIDE 2 MG/ML
4 INJECTION INTRAMUSCULAR; INTRAVENOUS EVERY 6 HOURS
Refills: 0 | Status: DISCONTINUED | OUTPATIENT
Start: 2024-06-18 | End: 2024-06-21

## 2024-06-18 RX ADMIN — Medication 2 TABLET(S): at 21:37

## 2024-06-18 RX ADMIN — INSULIN LISPRO 2: 100 INJECTION, SOLUTION SUBCUTANEOUS at 21:30

## 2024-06-18 RX ADMIN — Medication 1 APPLICATION(S): at 05:08

## 2024-06-18 RX ADMIN — Medication 62.5 MICROGRAM(S): at 05:30

## 2024-06-18 RX ADMIN — Medication 1000 MILLIGRAM(S): at 21:36

## 2024-06-18 RX ADMIN — Medication 70 MILLILITER(S): at 00:14

## 2024-06-18 RX ADMIN — KETOROLAC TROMETHAMINE 30 MILLIGRAM(S): 30 INJECTION, SOLUTION INTRAMUSCULAR at 13:30

## 2024-06-18 RX ADMIN — POTASSIUM CHLORIDE 40 MILLIEQUIVALENT(S): 600 TABLET, FILM COATED, EXTENDED RELEASE ORAL at 17:02

## 2024-06-18 RX ADMIN — INSULIN LISPRO 6: 100 INJECTION, SOLUTION SUBCUTANEOUS at 17:01

## 2024-06-18 RX ADMIN — Medication 100 MILLIGRAM(S): at 15:18

## 2024-06-18 RX ADMIN — HYDROCORTISONE 100 MILLIGRAM(S): 100 SUSPENSION RECTAL at 17:02

## 2024-06-18 RX ADMIN — KETOROLAC TROMETHAMINE 15 MILLIGRAM(S): 30 INJECTION, SOLUTION INTRAMUSCULAR at 13:33

## 2024-06-18 RX ADMIN — APREPITANT 40 MILLIGRAM(S): KIT at 06:13

## 2024-06-18 RX ADMIN — DEXAMETHASONE 4 MILLIGRAM(S): 1 TABLET ORAL at 05:30

## 2024-06-18 RX ADMIN — Medication 1 APPLICATION(S): at 06:30

## 2024-06-18 RX ADMIN — PANTOPRAZOLE SODIUM 40 MILLIGRAM(S): 40 INJECTION, POWDER, FOR SOLUTION INTRAVENOUS at 05:30

## 2024-06-18 RX ADMIN — Medication 1000 MILLIGRAM(S): at 06:13

## 2024-06-18 RX ADMIN — LISINOPRIL 10 MILLIGRAM(S): 5 TABLET ORAL at 21:29

## 2024-06-18 RX ADMIN — HYDROMORPHONE HCL 0.5 MILLIGRAM(S): 0.2 INJECTION, SOLUTION INTRAVENOUS at 12:26

## 2024-06-18 RX ADMIN — Medication 1000 MILLIGRAM(S): at 21:30

## 2024-06-18 RX ADMIN — INSULIN GLARGINE 8 UNIT(S): 100 INJECTION, SOLUTION SUBCUTANEOUS at 21:43

## 2024-06-18 RX ADMIN — INSULIN LISPRO 2: 100 INJECTION, SOLUTION SUBCUTANEOUS at 06:21

## 2024-06-18 RX ADMIN — NICARDIPINE HYDROCHLORIDE 25 MG/HR: 0.1 INJECTION, SOLUTION INTRAVENOUS at 21:42

## 2024-06-18 RX ADMIN — Medication 1000 MILLIGRAM(S): at 06:50

## 2024-06-18 RX ADMIN — NICARDIPINE HYDROCHLORIDE 25 MG/HR: 0.1 INJECTION, SOLUTION INTRAVENOUS at 12:26

## 2024-06-18 RX ADMIN — DEXTROSE MONOHYDRATE AND SODIUM CHLORIDE 1000 MILLILITER(S): 5; .3 INJECTION, SOLUTION INTRAVENOUS at 12:27

## 2024-06-19 ENCOUNTER — TRANSCRIPTION ENCOUNTER (OUTPATIENT)
Age: 74
End: 2024-06-19

## 2024-06-19 LAB
ANION GAP SERPL CALC-SCNC: 11 MMOL/L — SIGNIFICANT CHANGE UP (ref 5–17)
BUN SERPL-MCNC: 20 MG/DL — SIGNIFICANT CHANGE UP (ref 7–23)
CALCIUM SERPL-MCNC: 8.3 MG/DL — LOW (ref 8.4–10.5)
CHLORIDE SERPL-SCNC: 105 MMOL/L — SIGNIFICANT CHANGE UP (ref 96–108)
CO2 SERPL-SCNC: 22 MMOL/L — SIGNIFICANT CHANGE UP (ref 22–31)
CREAT SERPL-MCNC: 0.81 MG/DL — SIGNIFICANT CHANGE UP (ref 0.5–1.3)
EGFR: 76 ML/MIN/1.73M2 — SIGNIFICANT CHANGE UP
GLUCOSE BLDC GLUCOMTR-MCNC: 161 MG/DL — HIGH (ref 70–99)
GLUCOSE BLDC GLUCOMTR-MCNC: 167 MG/DL — HIGH (ref 70–99)
GLUCOSE BLDC GLUCOMTR-MCNC: 184 MG/DL — HIGH (ref 70–99)
GLUCOSE BLDC GLUCOMTR-MCNC: 233 MG/DL — HIGH (ref 70–99)
GLUCOSE SERPL-MCNC: 186 MG/DL — HIGH (ref 70–99)
HCT VFR BLD CALC: 29.2 % — LOW (ref 34.5–45)
HGB BLD-MCNC: 9.9 G/DL — LOW (ref 11.5–15.5)
MAGNESIUM SERPL-MCNC: 1.8 MG/DL — SIGNIFICANT CHANGE UP (ref 1.6–2.6)
MCHC RBC-ENTMCNC: 29.5 PG — SIGNIFICANT CHANGE UP (ref 27–34)
MCHC RBC-ENTMCNC: 33.9 GM/DL — SIGNIFICANT CHANGE UP (ref 32–36)
MCV RBC AUTO: 86.9 FL — SIGNIFICANT CHANGE UP (ref 80–100)
NRBC # BLD: 0 /100 WBCS — SIGNIFICANT CHANGE UP (ref 0–0)
PHOSPHATE SERPL-MCNC: 3.1 MG/DL — SIGNIFICANT CHANGE UP (ref 2.5–4.5)
PLATELET # BLD AUTO: 173 K/UL — SIGNIFICANT CHANGE UP (ref 150–400)
POTASSIUM SERPL-MCNC: 3.6 MMOL/L — SIGNIFICANT CHANGE UP (ref 3.5–5.3)
POTASSIUM SERPL-SCNC: 3.6 MMOL/L — SIGNIFICANT CHANGE UP (ref 3.5–5.3)
RBC # BLD: 3.36 M/UL — LOW (ref 3.8–5.2)
RBC # FLD: 16.2 % — HIGH (ref 10.3–14.5)
SODIUM SERPL-SCNC: 138 MMOL/L — SIGNIFICANT CHANGE UP (ref 135–145)
WBC # BLD: 14.99 K/UL — HIGH (ref 3.8–10.5)
WBC # FLD AUTO: 14.99 K/UL — HIGH (ref 3.8–10.5)

## 2024-06-19 PROCEDURE — 70553 MRI BRAIN STEM W/O & W/DYE: CPT | Mod: 26

## 2024-06-19 PROCEDURE — 99233 SBSQ HOSP IP/OBS HIGH 50: CPT

## 2024-06-19 PROCEDURE — 99024 POSTOP FOLLOW-UP VISIT: CPT

## 2024-06-19 RX ORDER — MAGNESIUM SULFATE 100 %
2 POWDER (GRAM) MISCELLANEOUS ONCE
Refills: 0 | Status: COMPLETED | OUTPATIENT
Start: 2024-06-19 | End: 2024-06-19

## 2024-06-19 RX ORDER — HYDROCORTISONE 100 MG/60ML
50 SUSPENSION RECTAL EVERY 8 HOURS
Refills: 0 | Status: DISCONTINUED | OUTPATIENT
Start: 2024-06-19 | End: 2024-06-20

## 2024-06-19 RX ORDER — SODIUM CHLORIDE 0.65 %
3 AEROSOL, SPRAY (ML) NASAL
Refills: 0 | Status: DISCONTINUED | OUTPATIENT
Start: 2024-06-19 | End: 2024-06-21

## 2024-06-19 RX ORDER — POTASSIUM CHLORIDE 600 MG/1
40 TABLET, FILM COATED, EXTENDED RELEASE ORAL ONCE
Refills: 0 | Status: COMPLETED | OUTPATIENT
Start: 2024-06-19 | End: 2024-06-19

## 2024-06-19 RX ADMIN — INSULIN LISPRO 2: 100 INJECTION, SOLUTION SUBCUTANEOUS at 06:33

## 2024-06-19 RX ADMIN — PANTOPRAZOLE SODIUM 40 MILLIGRAM(S): 40 INJECTION, POWDER, FOR SOLUTION INTRAVENOUS at 06:31

## 2024-06-19 RX ADMIN — Medication 3 SPRAY(S): at 11:34

## 2024-06-19 RX ADMIN — Medication 25 GRAM(S): at 07:05

## 2024-06-19 RX ADMIN — Medication 1000 MILLIGRAM(S): at 22:32

## 2024-06-19 RX ADMIN — Medication 2 TABLET(S): at 22:32

## 2024-06-19 RX ADMIN — Medication 1000 MILLIGRAM(S): at 05:58

## 2024-06-19 RX ADMIN — HYDROCORTISONE 100 MILLIGRAM(S): 100 SUSPENSION RECTAL at 00:23

## 2024-06-19 RX ADMIN — INSULIN GLARGINE 8 UNIT(S): 100 INJECTION, SOLUTION SUBCUTANEOUS at 22:32

## 2024-06-19 RX ADMIN — Medication 1000 MILLIGRAM(S): at 05:54

## 2024-06-19 RX ADMIN — POTASSIUM CHLORIDE 40 MILLIEQUIVALENT(S): 600 TABLET, FILM COATED, EXTENDED RELEASE ORAL at 07:05

## 2024-06-19 RX ADMIN — Medication 3 SPRAY(S): at 18:02

## 2024-06-19 RX ADMIN — HYDROCORTISONE 100 MILLIGRAM(S): 100 SUSPENSION RECTAL at 10:09

## 2024-06-19 RX ADMIN — INSULIN LISPRO 2: 100 INJECTION, SOLUTION SUBCUTANEOUS at 22:31

## 2024-06-19 RX ADMIN — Medication 100 MILLIGRAM(S): at 14:39

## 2024-06-19 RX ADMIN — INSULIN LISPRO 2: 100 INJECTION, SOLUTION SUBCUTANEOUS at 11:41

## 2024-06-19 RX ADMIN — Medication 1 APPLICATION(S): at 05:54

## 2024-06-19 RX ADMIN — INSULIN LISPRO 4: 100 INJECTION, SOLUTION SUBCUTANEOUS at 16:32

## 2024-06-19 RX ADMIN — HYDROCORTISONE 50 MILLIGRAM(S): 100 SUSPENSION RECTAL at 22:33

## 2024-06-19 RX ADMIN — Medication 75 MICROGRAM(S): at 05:56

## 2024-06-20 LAB
ANION GAP SERPL CALC-SCNC: 8 MMOL/L — SIGNIFICANT CHANGE UP (ref 5–17)
BUN SERPL-MCNC: 24 MG/DL — HIGH (ref 7–23)
CALCIUM SERPL-MCNC: 8.2 MG/DL — LOW (ref 8.4–10.5)
CHLORIDE SERPL-SCNC: 108 MMOL/L — SIGNIFICANT CHANGE UP (ref 96–108)
CO2 SERPL-SCNC: 24 MMOL/L — SIGNIFICANT CHANGE UP (ref 22–31)
CREAT SERPL-MCNC: 0.96 MG/DL — SIGNIFICANT CHANGE UP (ref 0.5–1.3)
EGFR: 62 ML/MIN/1.73M2 — SIGNIFICANT CHANGE UP
GLUCOSE BLDC GLUCOMTR-MCNC: 169 MG/DL — HIGH (ref 70–99)
GLUCOSE BLDC GLUCOMTR-MCNC: 179 MG/DL — HIGH (ref 70–99)
GLUCOSE BLDC GLUCOMTR-MCNC: 187 MG/DL — HIGH (ref 70–99)
GLUCOSE BLDC GLUCOMTR-MCNC: 196 MG/DL — HIGH (ref 70–99)
GLUCOSE SERPL-MCNC: 161 MG/DL — HIGH (ref 70–99)
HCT VFR BLD CALC: 27.4 % — LOW (ref 34.5–45)
HGB BLD-MCNC: 8.7 G/DL — LOW (ref 11.5–15.5)
MAGNESIUM SERPL-MCNC: 2.5 MG/DL — SIGNIFICANT CHANGE UP (ref 1.6–2.6)
MCHC RBC-ENTMCNC: 29.3 PG — SIGNIFICANT CHANGE UP (ref 27–34)
MCHC RBC-ENTMCNC: 31.8 GM/DL — LOW (ref 32–36)
MCV RBC AUTO: 92.3 FL — SIGNIFICANT CHANGE UP (ref 80–100)
NRBC # BLD: 0 /100 WBCS — SIGNIFICANT CHANGE UP (ref 0–0)
PHOSPHATE SERPL-MCNC: 2.9 MG/DL — SIGNIFICANT CHANGE UP (ref 2.5–4.5)
PLATELET # BLD AUTO: 132 K/UL — LOW (ref 150–400)
POTASSIUM SERPL-MCNC: 4 MMOL/L — SIGNIFICANT CHANGE UP (ref 3.5–5.3)
POTASSIUM SERPL-SCNC: 4 MMOL/L — SIGNIFICANT CHANGE UP (ref 3.5–5.3)
RBC # BLD: 2.97 M/UL — LOW (ref 3.8–5.2)
RBC # FLD: 16.5 % — HIGH (ref 10.3–14.5)
SODIUM SERPL-SCNC: 140 MMOL/L — SIGNIFICANT CHANGE UP (ref 135–145)
WBC # BLD: 8.23 K/UL — SIGNIFICANT CHANGE UP (ref 3.8–10.5)
WBC # FLD AUTO: 8.23 K/UL — SIGNIFICANT CHANGE UP (ref 3.8–10.5)

## 2024-06-20 PROCEDURE — 99233 SBSQ HOSP IP/OBS HIGH 50: CPT

## 2024-06-20 PROCEDURE — 99024 POSTOP FOLLOW-UP VISIT: CPT

## 2024-06-20 RX ORDER — SOD PHOS DI, MONO/K PHOS MONO 250 MG
1 TABLET ORAL ONCE
Refills: 0 | Status: COMPLETED | OUTPATIENT
Start: 2024-06-20 | End: 2024-06-20

## 2024-06-20 RX ORDER — HYDROCORTISONE 100 MG/60ML
50 SUSPENSION RECTAL EVERY 12 HOURS
Refills: 0 | Status: DISCONTINUED | OUTPATIENT
Start: 2024-06-20 | End: 2024-06-20

## 2024-06-20 RX ORDER — HYDROCORTISONE 100 MG/60ML
50 SUSPENSION RECTAL EVERY 12 HOURS
Refills: 0 | Status: DISCONTINUED | OUTPATIENT
Start: 2024-06-20 | End: 2024-06-21

## 2024-06-20 RX ORDER — ENOXAPARIN SODIUM 100 MG/ML
40 INJECTION SUBCUTANEOUS
Refills: 0 | Status: DISCONTINUED | OUTPATIENT
Start: 2024-06-20 | End: 2024-06-21

## 2024-06-20 RX ADMIN — Medication 2 TABLET(S): at 21:04

## 2024-06-20 RX ADMIN — ENOXAPARIN SODIUM 40 MILLIGRAM(S): 100 INJECTION SUBCUTANEOUS at 21:05

## 2024-06-20 RX ADMIN — Medication 3 SPRAY(S): at 23:00

## 2024-06-20 RX ADMIN — Medication 1 APPLICATION(S): at 05:29

## 2024-06-20 RX ADMIN — LISINOPRIL 10 MILLIGRAM(S): 5 TABLET ORAL at 05:19

## 2024-06-20 RX ADMIN — HYDROCORTISONE 50 MILLIGRAM(S): 100 SUSPENSION RECTAL at 17:02

## 2024-06-20 RX ADMIN — Medication 1000 MILLIGRAM(S): at 21:04

## 2024-06-20 RX ADMIN — Medication 100 MILLIGRAM(S): at 14:31

## 2024-06-20 RX ADMIN — Medication 1000 MILLIGRAM(S): at 14:31

## 2024-06-20 RX ADMIN — Medication 75 MICROGRAM(S): at 05:18

## 2024-06-20 RX ADMIN — HYDROCORTISONE 50 MILLIGRAM(S): 100 SUSPENSION RECTAL at 05:19

## 2024-06-20 RX ADMIN — Medication 1 PACKET(S): at 08:07

## 2024-06-20 RX ADMIN — INSULIN LISPRO 2: 100 INJECTION, SOLUTION SUBCUTANEOUS at 21:56

## 2024-06-20 RX ADMIN — INSULIN LISPRO 2: 100 INJECTION, SOLUTION SUBCUTANEOUS at 07:09

## 2024-06-20 RX ADMIN — Medication 3 SPRAY(S): at 17:05

## 2024-06-20 RX ADMIN — INSULIN LISPRO 2: 100 INJECTION, SOLUTION SUBCUTANEOUS at 16:32

## 2024-06-20 RX ADMIN — Medication 3 SPRAY(S): at 00:38

## 2024-06-20 RX ADMIN — INSULIN LISPRO 2: 100 INJECTION, SOLUTION SUBCUTANEOUS at 11:36

## 2024-06-20 RX ADMIN — Medication 1000 MILLIGRAM(S): at 05:19

## 2024-06-20 RX ADMIN — Medication 3 SPRAY(S): at 05:19

## 2024-06-20 RX ADMIN — Medication 1000 MILLIGRAM(S): at 14:12

## 2024-06-20 RX ADMIN — Medication 1000 MILLIGRAM(S): at 22:04

## 2024-06-20 RX ADMIN — Medication 3 SPRAY(S): at 13:01

## 2024-06-20 RX ADMIN — PANTOPRAZOLE SODIUM 40 MILLIGRAM(S): 40 INJECTION, POWDER, FOR SOLUTION INTRAVENOUS at 05:18

## 2024-06-20 RX ADMIN — INSULIN GLARGINE 8 UNIT(S): 100 INJECTION, SOLUTION SUBCUTANEOUS at 21:56

## 2024-06-21 ENCOUNTER — TRANSCRIPTION ENCOUNTER (OUTPATIENT)
Age: 74
End: 2024-06-21

## 2024-06-21 VITALS
SYSTOLIC BLOOD PRESSURE: 162 MMHG | RESPIRATION RATE: 20 BRPM | OXYGEN SATURATION: 98 % | DIASTOLIC BLOOD PRESSURE: 70 MMHG | HEART RATE: 66 BPM

## 2024-06-21 PROBLEM — E78.5 HYPERLIPIDEMIA, UNSPECIFIED: Chronic | Status: ACTIVE | Noted: 2024-06-17

## 2024-06-21 PROBLEM — E23.7 DISORDER OF PITUITARY GLAND, UNSPECIFIED: Chronic | Status: ACTIVE | Noted: 2024-06-17

## 2024-06-21 LAB
ANION GAP SERPL CALC-SCNC: 9 MMOL/L — SIGNIFICANT CHANGE UP (ref 5–17)
BUN SERPL-MCNC: 21 MG/DL — SIGNIFICANT CHANGE UP (ref 7–23)
CALCIUM SERPL-MCNC: 8.3 MG/DL — LOW (ref 8.4–10.5)
CHLORIDE SERPL-SCNC: 106 MMOL/L — SIGNIFICANT CHANGE UP (ref 96–108)
CO2 SERPL-SCNC: 24 MMOL/L — SIGNIFICANT CHANGE UP (ref 22–31)
CREAT SERPL-MCNC: 0.85 MG/DL — SIGNIFICANT CHANGE UP (ref 0.5–1.3)
EGFR: 71 ML/MIN/1.73M2 — SIGNIFICANT CHANGE UP
GLUCOSE BLDC GLUCOMTR-MCNC: 115 MG/DL — HIGH (ref 70–99)
GLUCOSE BLDC GLUCOMTR-MCNC: 176 MG/DL — HIGH (ref 70–99)
GLUCOSE SERPL-MCNC: 90 MG/DL — SIGNIFICANT CHANGE UP (ref 70–99)
HCT VFR BLD CALC: 28.3 % — LOW (ref 34.5–45)
HGB BLD-MCNC: 9.1 G/DL — LOW (ref 11.5–15.5)
MAGNESIUM SERPL-MCNC: 2.3 MG/DL — SIGNIFICANT CHANGE UP (ref 1.6–2.6)
MCHC RBC-ENTMCNC: 29.3 PG — SIGNIFICANT CHANGE UP (ref 27–34)
MCHC RBC-ENTMCNC: 32.2 GM/DL — SIGNIFICANT CHANGE UP (ref 32–36)
MCV RBC AUTO: 91 FL — SIGNIFICANT CHANGE UP (ref 80–100)
NRBC # BLD: 0 /100 WBCS — SIGNIFICANT CHANGE UP (ref 0–0)
PHOSPHATE SERPL-MCNC: 2.5 MG/DL — SIGNIFICANT CHANGE UP (ref 2.5–4.5)
PLATELET # BLD AUTO: 129 K/UL — LOW (ref 150–400)
POTASSIUM SERPL-MCNC: 3.8 MMOL/L — SIGNIFICANT CHANGE UP (ref 3.5–5.3)
POTASSIUM SERPL-SCNC: 3.8 MMOL/L — SIGNIFICANT CHANGE UP (ref 3.5–5.3)
RBC # BLD: 3.11 M/UL — LOW (ref 3.8–5.2)
RBC # FLD: 16.3 % — HIGH (ref 10.3–14.5)
SODIUM SERPL-SCNC: 139 MMOL/L — SIGNIFICANT CHANGE UP (ref 135–145)
SURGICAL PATHOLOGY STUDY: SIGNIFICANT CHANGE UP
WBC # BLD: 6.02 K/UL — SIGNIFICANT CHANGE UP (ref 3.8–10.5)
WBC # FLD AUTO: 6.02 K/UL — SIGNIFICANT CHANGE UP (ref 3.8–10.5)

## 2024-06-21 PROCEDURE — 84443 ASSAY THYROID STIM HORMONE: CPT

## 2024-06-21 PROCEDURE — 80048 BASIC METABOLIC PNL TOTAL CA: CPT

## 2024-06-21 PROCEDURE — C1889: CPT

## 2024-06-21 PROCEDURE — 85610 PROTHROMBIN TIME: CPT

## 2024-06-21 PROCEDURE — 83003 ASSAY GROWTH HORMONE (HGH): CPT

## 2024-06-21 PROCEDURE — 97163 PT EVAL HIGH COMPLEX 45 MIN: CPT

## 2024-06-21 PROCEDURE — 83001 ASSAY OF GONADOTROPIN (FSH): CPT

## 2024-06-21 PROCEDURE — 88334 PATH CONSLTJ SURG CYTO XM EA: CPT

## 2024-06-21 PROCEDURE — 99024 POSTOP FOLLOW-UP VISIT: CPT

## 2024-06-21 PROCEDURE — 70553 MRI BRAIN STEM W/O & W/DYE: CPT | Mod: MC

## 2024-06-21 PROCEDURE — 83735 ASSAY OF MAGNESIUM: CPT

## 2024-06-21 PROCEDURE — 99285 EMERGENCY DEPT VISIT HI MDM: CPT | Mod: 25

## 2024-06-21 PROCEDURE — 71045 X-RAY EXAM CHEST 1 VIEW: CPT

## 2024-06-21 PROCEDURE — 86850 RBC ANTIBODY SCREEN: CPT

## 2024-06-21 PROCEDURE — 86900 BLOOD TYPING SEROLOGIC ABO: CPT

## 2024-06-21 PROCEDURE — 84146 ASSAY OF PROLACTIN: CPT

## 2024-06-21 PROCEDURE — 88307 TISSUE EXAM BY PATHOLOGIST: CPT

## 2024-06-21 PROCEDURE — 36415 COLL VENOUS BLD VENIPUNCTURE: CPT

## 2024-06-21 PROCEDURE — 85730 THROMBOPLASTIN TIME PARTIAL: CPT

## 2024-06-21 PROCEDURE — 85027 COMPLETE CBC AUTOMATED: CPT

## 2024-06-21 PROCEDURE — A9585: CPT

## 2024-06-21 PROCEDURE — 88311 DECALCIFY TISSUE: CPT

## 2024-06-21 PROCEDURE — 88342 IMHCHEM/IMCYTCHM 1ST ANTB: CPT

## 2024-06-21 PROCEDURE — 82962 GLUCOSE BLOOD TEST: CPT

## 2024-06-21 PROCEDURE — 83036 HEMOGLOBIN GLYCOSYLATED A1C: CPT

## 2024-06-21 PROCEDURE — 85025 COMPLETE CBC W/AUTO DIFF WBC: CPT

## 2024-06-21 PROCEDURE — 88333 PATH CONSLTJ SURG CYTO XM 1: CPT

## 2024-06-21 PROCEDURE — 84100 ASSAY OF PHOSPHORUS: CPT

## 2024-06-21 PROCEDURE — 70486 CT MAXILLOFACIAL W/O DYE: CPT | Mod: MC

## 2024-06-21 PROCEDURE — 80053 COMPREHEN METABOLIC PANEL: CPT

## 2024-06-21 PROCEDURE — 97165 OT EVAL LOW COMPLEX 30 MIN: CPT

## 2024-06-21 PROCEDURE — 86901 BLOOD TYPING SEROLOGIC RH(D): CPT

## 2024-06-21 PROCEDURE — 84439 ASSAY OF FREE THYROXINE: CPT

## 2024-06-21 PROCEDURE — 88341 IMHCHEM/IMCYTCHM EA ADD ANTB: CPT

## 2024-06-21 PROCEDURE — 84305 ASSAY OF SOMATOMEDIN: CPT

## 2024-06-21 PROCEDURE — 82024 ASSAY OF ACTH: CPT

## 2024-06-21 PROCEDURE — 88360 TUMOR IMMUNOHISTOCHEM/MANUAL: CPT

## 2024-06-21 PROCEDURE — 88331 PATH CONSLTJ SURG 1 BLK 1SPC: CPT

## 2024-06-21 PROCEDURE — 88304 TISSUE EXAM BY PATHOLOGIST: CPT

## 2024-06-21 PROCEDURE — 83002 ASSAY OF GONADOTROPIN (LH): CPT

## 2024-06-21 PROCEDURE — 99232 SBSQ HOSP IP/OBS MODERATE 35: CPT

## 2024-06-21 RX ORDER — POTASSIUM CHLORIDE 600 MG/1
40 TABLET, FILM COATED, EXTENDED RELEASE ORAL ONCE
Refills: 0 | Status: COMPLETED | OUTPATIENT
Start: 2024-06-21 | End: 2024-06-21

## 2024-06-21 RX ORDER — ACETAMINOPHEN 325 MG
2 TABLET ORAL
Qty: 0 | Refills: 0 | DISCHARGE
Start: 2024-06-21

## 2024-06-21 RX ORDER — LISINOPRIL 5 MG/1
1 TABLET ORAL
Qty: 30 | Refills: 0
Start: 2024-06-21 | End: 2024-07-20

## 2024-06-21 RX ORDER — HYDROCORTISONE 100 MG/60ML
1 SUSPENSION RECTAL
Refills: 0 | DISCHARGE

## 2024-06-21 RX ORDER — LEVOTHYROXINE SODIUM 25 MCG
1 TABLET ORAL
Refills: 0 | DISCHARGE

## 2024-06-21 RX ORDER — LEVOTHYROXINE SODIUM 25 MCG
1 TABLET ORAL
Qty: 30 | Refills: 0
Start: 2024-06-21 | End: 2024-07-20

## 2024-06-21 RX ORDER — SODIUM CHLORIDE 0.65 %
3 AEROSOL, SPRAY (ML) NASAL
Qty: 1 | Refills: 0
Start: 2024-06-21 | End: 2024-07-20

## 2024-06-21 RX ORDER — PANTOPRAZOLE SODIUM 40 MG/10ML
1 INJECTION, POWDER, FOR SOLUTION INTRAVENOUS
Qty: 30 | Refills: 0
Start: 2024-06-21 | End: 2024-07-20

## 2024-06-21 RX ORDER — HYDROCORTISONE 100 MG/60ML
2 SUSPENSION RECTAL
Qty: 90 | Refills: 0
Start: 2024-06-21 | End: 2024-07-20

## 2024-06-21 RX ORDER — METOPROLOL TARTRATE 50 MG
1 TABLET ORAL
Refills: 0 | DISCHARGE

## 2024-06-21 RX ORDER — SENNOSIDES 8.6 MG
2 TABLET ORAL
Qty: 0 | Refills: 0 | DISCHARGE
Start: 2024-06-21

## 2024-06-21 RX ADMIN — LISINOPRIL 10 MILLIGRAM(S): 5 TABLET ORAL at 05:05

## 2024-06-21 RX ADMIN — Medication 1000 MILLIGRAM(S): at 05:04

## 2024-06-21 RX ADMIN — PANTOPRAZOLE SODIUM 40 MILLIGRAM(S): 40 INJECTION, POWDER, FOR SOLUTION INTRAVENOUS at 05:04

## 2024-06-21 RX ADMIN — Medication 1000 MILLIGRAM(S): at 06:04

## 2024-06-21 RX ADMIN — Medication 3 SPRAY(S): at 12:38

## 2024-06-21 RX ADMIN — INSULIN LISPRO 2: 100 INJECTION, SOLUTION SUBCUTANEOUS at 12:35

## 2024-06-21 RX ADMIN — HYDROCORTISONE 50 MILLIGRAM(S): 100 SUSPENSION RECTAL at 05:05

## 2024-06-21 RX ADMIN — Medication 75 MICROGRAM(S): at 05:05

## 2024-06-21 RX ADMIN — Medication 1000 MILLIGRAM(S): at 14:13

## 2024-06-21 RX ADMIN — Medication 1000 MILLIGRAM(S): at 13:44

## 2024-06-21 RX ADMIN — Medication 3 SPRAY(S): at 05:07

## 2024-06-21 RX ADMIN — POTASSIUM CHLORIDE 40 MILLIEQUIVALENT(S): 600 TABLET, FILM COATED, EXTENDED RELEASE ORAL at 13:44

## 2024-06-24 ENCOUNTER — NON-APPOINTMENT (OUTPATIENT)
Age: 74
End: 2024-06-24

## 2024-06-27 ENCOUNTER — APPOINTMENT (OUTPATIENT)
Dept: OTOLARYNGOLOGY | Facility: CLINIC | Age: 74
End: 2024-06-27

## 2024-06-27 DIAGNOSIS — D35.2 BENIGN NEOPLASM OF PITUITARY GLAND: ICD-10-CM

## 2024-06-27 PROCEDURE — 99204 OFFICE O/P NEW MOD 45 MIN: CPT

## 2024-06-28 ENCOUNTER — APPOINTMENT (OUTPATIENT)
Dept: OPHTHALMOLOGY | Facility: CLINIC | Age: 74
End: 2024-06-28

## 2024-07-02 ENCOUNTER — NON-APPOINTMENT (OUTPATIENT)
Age: 74
End: 2024-07-02

## 2024-07-02 DIAGNOSIS — E78.5 HYPERLIPIDEMIA, UNSPECIFIED: ICD-10-CM

## 2024-07-02 DIAGNOSIS — Z79.52 LONG TERM (CURRENT) USE OF SYSTEMIC STEROIDS: ICD-10-CM

## 2024-07-02 DIAGNOSIS — E03.9 HYPOTHYROIDISM, UNSPECIFIED: ICD-10-CM

## 2024-07-02 DIAGNOSIS — H46.8 OTHER OPTIC NEURITIS: ICD-10-CM

## 2024-07-02 DIAGNOSIS — Z79.890 HORMONE REPLACEMENT THERAPY: ICD-10-CM

## 2024-07-02 DIAGNOSIS — D72.828 OTHER ELEVATED WHITE BLOOD CELL COUNT: ICD-10-CM

## 2024-07-02 DIAGNOSIS — T38.0X5A ADVERSE EFFECT OF GLUCOCORTICOIDS AND SYNTHETIC ANALOGUES, INITIAL ENCOUNTER: ICD-10-CM

## 2024-07-02 DIAGNOSIS — D35.2 BENIGN NEOPLASM OF PITUITARY GLAND: ICD-10-CM

## 2024-07-02 DIAGNOSIS — G93.5 COMPRESSION OF BRAIN: ICD-10-CM

## 2024-07-02 DIAGNOSIS — H47.42 DISORDERS OF OPTIC CHIASM IN (DUE TO) NEOPLASM: ICD-10-CM

## 2024-07-02 DIAGNOSIS — D3A.8 OTHER BENIGN NEUROENDOCRINE TUMORS: ICD-10-CM

## 2024-07-02 DIAGNOSIS — R00.1 BRADYCARDIA, UNSPECIFIED: ICD-10-CM

## 2024-07-02 DIAGNOSIS — I10 ESSENTIAL (PRIMARY) HYPERTENSION: ICD-10-CM

## 2024-07-02 DIAGNOSIS — R73.9 HYPERGLYCEMIA, UNSPECIFIED: ICD-10-CM

## 2024-07-03 ENCOUNTER — APPOINTMENT (OUTPATIENT)
Dept: NEUROSURGERY | Facility: CLINIC | Age: 74
End: 2024-07-03
Payer: MEDICARE

## 2024-07-03 DIAGNOSIS — H53.9 UNSPECIFIED VISUAL DISTURBANCE: ICD-10-CM

## 2024-07-03 PROCEDURE — 99442: CPT | Mod: 93

## 2024-07-03 RX ORDER — LEVOTHYROXINE SODIUM 75 UG/1
75 CAPSULE ORAL
Refills: 0 | Status: ACTIVE | COMMUNITY

## 2024-07-03 RX ORDER — LISINOPRIL 10 MG/1
10 TABLET ORAL
Refills: 0 | Status: ACTIVE | COMMUNITY

## 2024-07-03 RX ORDER — HYDROCORTISONE 20 MG/1
20 TABLET ORAL
Refills: 0 | Status: ACTIVE | COMMUNITY

## 2024-07-03 RX ORDER — HYDROCORTISONE 10 MG/1
10 TABLET ORAL EVERY MORNING
Refills: 0 | Status: ACTIVE | COMMUNITY

## 2024-07-09 ENCOUNTER — APPOINTMENT (OUTPATIENT)
Dept: NEUROSURGERY | Facility: CLINIC | Age: 74
End: 2024-07-09
Payer: MEDICARE

## 2024-07-09 ENCOUNTER — APPOINTMENT (OUTPATIENT)
Dept: ENDOCRINOLOGY | Facility: CLINIC | Age: 74
End: 2024-07-09

## 2024-07-09 VITALS
HEART RATE: 57 BPM | DIASTOLIC BLOOD PRESSURE: 80 MMHG | SYSTOLIC BLOOD PRESSURE: 132 MMHG | HEIGHT: 60 IN | OXYGEN SATURATION: 99 % | TEMPERATURE: 97.8 F | BODY MASS INDEX: 30.43 KG/M2 | WEIGHT: 155 LBS

## 2024-07-09 VITALS
TEMPERATURE: 97.5 F | DIASTOLIC BLOOD PRESSURE: 78 MMHG | SYSTOLIC BLOOD PRESSURE: 144 MMHG | BODY MASS INDEX: 30.63 KG/M2 | HEART RATE: 58 BPM | WEIGHT: 156 LBS | OXYGEN SATURATION: 98 % | RESPIRATION RATE: 18 BRPM | HEIGHT: 60 IN

## 2024-07-09 DIAGNOSIS — E11.9 TYPE 2 DIABETES MELLITUS W/OUT COMPLICATIONS: ICD-10-CM

## 2024-07-09 PROCEDURE — 99204 OFFICE O/P NEW MOD 45 MIN: CPT

## 2024-07-09 PROCEDURE — 99024 POSTOP FOLLOW-UP VISIT: CPT

## 2024-07-09 RX ORDER — HYDROCORTISONE 10 MG/1
10 TABLET ORAL
Qty: 270 | Refills: 1 | Status: ACTIVE | COMMUNITY
Start: 2024-07-09 | End: 1900-01-01

## 2024-07-09 RX ORDER — LEVOTHYROXINE SODIUM 0.07 MG/1
75 TABLET ORAL DAILY
Qty: 1 | Refills: 1 | Status: ACTIVE | COMMUNITY
Start: 2024-07-09 | End: 1900-01-01

## 2024-07-10 ENCOUNTER — APPOINTMENT (OUTPATIENT)
Dept: NEUROSURGERY | Facility: CLINIC | Age: 74
End: 2024-07-10

## 2024-07-11 ENCOUNTER — APPOINTMENT (OUTPATIENT)
Dept: OTOLARYNGOLOGY | Facility: CLINIC | Age: 74
End: 2024-07-11

## 2024-07-11 ENCOUNTER — APPOINTMENT (OUTPATIENT)
Dept: INTERNAL MEDICINE | Facility: CLINIC | Age: 74
End: 2024-07-11

## 2024-07-11 DIAGNOSIS — D35.2 BENIGN NEOPLASM OF PITUITARY GLAND: ICD-10-CM

## 2024-07-11 PROCEDURE — 31231 NASAL ENDOSCOPY DX: CPT | Mod: 52

## 2024-07-11 PROCEDURE — 99024 POSTOP FOLLOW-UP VISIT: CPT

## 2024-07-12 LAB
ANION GAP SERPL CALC-SCNC: 14 MMOL/L
BUN SERPL-MCNC: 26 MG/DL
CALCIUM SERPL-MCNC: 9.5 MG/DL
CHLORIDE SERPL-SCNC: 101 MMOL/L
CHOLEST SERPL-MCNC: 371 MG/DL
EGFR: 61 ML/MIN/1.73M2
ESTIMATED AVERAGE GLUCOSE: 128 MG/DL
FSH SERPL-MCNC: 2.6 IU/L
GLUCOSE SERPL-MCNC: 94 MG/DL
HBA1C MFR BLD HPLC: 6.1 %
HDLC SERPL-MCNC: 69 MG/DL
IGF-I BLD-MCNC: 71 NG/ML
LDLC SERPL CALC-MCNC: 264 MG/DL
LH SERPL-ACNC: 0.5 IU/L
NONHDLC SERPL-MCNC: 302 MG/DL
OSMOLALITY SERPL: 289 MOSMOL/KG
OSMOLALITY UR: 473 MOSM/KG
POTASSIUM SERPL-SCNC: 4.1 MMOL/L
PROLACTIN SERPL-MCNC: 29.9 NG/ML
T4 FREE SERPL-MCNC: 1.4 NG/DL
TRIGL SERPL-MCNC: 189 MG/DL

## 2024-12-17 ENCOUNTER — APPOINTMENT (OUTPATIENT)
Dept: OTOLARYNGOLOGY | Facility: CLINIC | Age: 74
End: 2024-12-17
Payer: MEDICARE

## 2024-12-17 ENCOUNTER — APPOINTMENT (OUTPATIENT)
Dept: ENDOCRINOLOGY | Facility: CLINIC | Age: 74
End: 2024-12-17

## 2024-12-17 DIAGNOSIS — D64.9 ANEMIA, UNSPECIFIED: ICD-10-CM

## 2024-12-17 DIAGNOSIS — E55.9 VITAMIN D DEFICIENCY, UNSPECIFIED: ICD-10-CM

## 2024-12-17 DIAGNOSIS — I10 ESSENTIAL (PRIMARY) HYPERTENSION: ICD-10-CM

## 2024-12-17 DIAGNOSIS — E78.5 HYPERLIPIDEMIA, UNSPECIFIED: ICD-10-CM

## 2024-12-17 PROCEDURE — G2211 COMPLEX E/M VISIT ADD ON: CPT

## 2024-12-17 PROCEDURE — 99213 OFFICE O/P EST LOW 20 MIN: CPT

## 2024-12-17 PROCEDURE — 99213 OFFICE O/P EST LOW 20 MIN: CPT | Mod: 25

## 2024-12-17 PROCEDURE — 31231 NASAL ENDOSCOPY DX: CPT

## 2024-12-24 ENCOUNTER — APPOINTMENT (OUTPATIENT)
Dept: NEUROSURGERY | Facility: CLINIC | Age: 74
End: 2024-12-24
Payer: MEDICARE

## 2024-12-24 DIAGNOSIS — D35.2 BENIGN NEOPLASM OF PITUITARY GLAND: ICD-10-CM

## 2024-12-24 PROCEDURE — 99213 OFFICE O/P EST LOW 20 MIN: CPT

## 2025-01-09 ENCOUNTER — OUTPATIENT (OUTPATIENT)
Dept: OUTPATIENT SERVICES | Facility: HOSPITAL | Age: 75
LOS: 1 days | End: 2025-01-09
Payer: MEDICARE

## 2025-01-09 ENCOUNTER — APPOINTMENT (OUTPATIENT)
Dept: MRI IMAGING | Facility: IMAGING CENTER | Age: 75
End: 2025-01-09

## 2025-01-09 DIAGNOSIS — D35.2 BENIGN NEOPLASM OF PITUITARY GLAND: ICD-10-CM

## 2025-01-09 DIAGNOSIS — Z98.890 OTHER SPECIFIED POSTPROCEDURAL STATES: Chronic | ICD-10-CM

## 2025-01-09 PROCEDURE — A9585: CPT

## 2025-01-09 PROCEDURE — 70553 MRI BRAIN STEM W/O & W/DYE: CPT | Mod: 26

## 2025-01-09 PROCEDURE — 70553 MRI BRAIN STEM W/O & W/DYE: CPT

## 2025-01-15 ENCOUNTER — APPOINTMENT (OUTPATIENT)
Dept: ENDOCRINOLOGY | Facility: CLINIC | Age: 75
End: 2025-01-15
Payer: MEDICARE

## 2025-01-15 DIAGNOSIS — D35.2 BENIGN NEOPLASM OF PITUITARY GLAND: ICD-10-CM

## 2025-01-15 PROCEDURE — 99214 OFFICE O/P EST MOD 30 MIN: CPT | Mod: 2W

## 2025-02-13 ENCOUNTER — LABORATORY RESULT (OUTPATIENT)
Age: 75
End: 2025-02-13

## 2025-02-13 ENCOUNTER — NON-APPOINTMENT (OUTPATIENT)
Age: 75
End: 2025-02-13

## 2025-02-13 ENCOUNTER — APPOINTMENT (OUTPATIENT)
Dept: INTERNAL MEDICINE | Facility: CLINIC | Age: 75
End: 2025-02-13
Payer: MEDICARE

## 2025-02-13 VITALS
HEIGHT: 60 IN | TEMPERATURE: 97.2 F | WEIGHT: 168 LBS | BODY MASS INDEX: 32.98 KG/M2 | SYSTOLIC BLOOD PRESSURE: 171 MMHG | DIASTOLIC BLOOD PRESSURE: 79 MMHG | OXYGEN SATURATION: 95 % | HEART RATE: 71 BPM

## 2025-02-13 DIAGNOSIS — Z92.89 PERSONAL HISTORY OF OTHER MEDICAL TREATMENT: ICD-10-CM

## 2025-02-13 DIAGNOSIS — R05.8 OTHER SPECIFIED COUGH: ICD-10-CM

## 2025-02-13 DIAGNOSIS — M17.12 UNILATERAL PRIMARY OSTEOARTHRITIS, LEFT KNEE: ICD-10-CM

## 2025-02-13 DIAGNOSIS — K71.6 TOXIC LIVER DISEASE WITH HEPATITIS, NOT ELSEWHERE CLASSIFIED: ICD-10-CM

## 2025-02-13 DIAGNOSIS — E78.5 HYPERLIPIDEMIA, UNSPECIFIED: ICD-10-CM

## 2025-02-13 DIAGNOSIS — T50.905A TOXIC LIVER DISEASE WITH HEPATITIS, NOT ELSEWHERE CLASSIFIED: ICD-10-CM

## 2025-02-13 DIAGNOSIS — Z23 ENCOUNTER FOR IMMUNIZATION: ICD-10-CM

## 2025-02-13 DIAGNOSIS — E11.9 TYPE 2 DIABETES MELLITUS W/OUT COMPLICATIONS: ICD-10-CM

## 2025-02-13 DIAGNOSIS — Z86.79 PERSONAL HISTORY OF OTHER DISEASES OF THE CIRCULATORY SYSTEM: ICD-10-CM

## 2025-02-13 DIAGNOSIS — Z00.00 ENCOUNTER FOR GENERAL ADULT MEDICAL EXAMINATION W/OUT ABNORMAL FINDINGS: ICD-10-CM

## 2025-02-13 DIAGNOSIS — D64.9 ANEMIA, UNSPECIFIED: ICD-10-CM

## 2025-02-13 DIAGNOSIS — H53.9 UNSPECIFIED VISUAL DISTURBANCE: ICD-10-CM

## 2025-02-13 PROCEDURE — 36415 COLL VENOUS BLD VENIPUNCTURE: CPT

## 2025-02-13 PROCEDURE — G0439: CPT

## 2025-02-20 ENCOUNTER — APPOINTMENT (OUTPATIENT)
Dept: NEUROSURGERY | Facility: CLINIC | Age: 75
End: 2025-02-20
Payer: MEDICARE

## 2025-02-20 DIAGNOSIS — Z87.09 PERSONAL HISTORY OF OTHER DISEASES OF THE RESPIRATORY SYSTEM: ICD-10-CM

## 2025-02-20 DIAGNOSIS — Z87.448 PERSONAL HISTORY OF OTHER DISEASES OF URINARY SYSTEM: ICD-10-CM

## 2025-02-20 DIAGNOSIS — Z78.9 OTHER SPECIFIED HEALTH STATUS: ICD-10-CM

## 2025-02-20 DIAGNOSIS — D35.2 BENIGN NEOPLASM OF PITUITARY GLAND: ICD-10-CM

## 2025-02-20 PROCEDURE — 99214 OFFICE O/P EST MOD 30 MIN: CPT | Mod: 2W

## 2025-02-21 ENCOUNTER — TRANSCRIPTION ENCOUNTER (OUTPATIENT)
Age: 75
End: 2025-02-21

## 2025-02-21 ENCOUNTER — NON-APPOINTMENT (OUTPATIENT)
Age: 75
End: 2025-02-21

## 2025-02-21 LAB
25(OH)D3 SERPL-MCNC: 29.1 NG/ML
ALBUMIN SERPL ELPH-MCNC: 4.4 G/DL
ALP BLD-CCNC: 43 U/L
ALT SERPL-CCNC: 9 U/L
ANION GAP SERPL CALC-SCNC: 12 MMOL/L
APPEARANCE: CLEAR
AST SERPL-CCNC: 12 U/L
BASOPHILS # BLD AUTO: 0.06 K/UL
BASOPHILS NFR BLD AUTO: 0.9 %
BILIRUB SERPL-MCNC: 1.1 MG/DL
BILIRUBIN URINE: NEGATIVE
BLOOD URINE: ABNORMAL
BUN SERPL-MCNC: 20 MG/DL
CALCIUM SERPL-MCNC: 9.7 MG/DL
CHLORIDE SERPL-SCNC: 101 MMOL/L
CHOLEST SERPL-MCNC: 179 MG/DL
CO2 SERPL-SCNC: 26 MMOL/L
COLOR: YELLOW
CREAT SERPL-MCNC: 0.95 MG/DL
CREAT SPEC-SCNC: 40 MG/DL
EGFR: 62 ML/MIN/1.73M2
EOSINOPHIL # BLD AUTO: 0.1 K/UL
EOSINOPHIL NFR BLD AUTO: 1.5 %
ESTIMATED AVERAGE GLUCOSE: 137 MG/DL
GLUCOSE QUALITATIVE U: NEGATIVE MG/DL
GLUCOSE SERPL-MCNC: 115 MG/DL
HBA1C MFR BLD HPLC: 6.4 %
HCT VFR BLD CALC: 35.3 %
HDLC SERPL-MCNC: 49 MG/DL
HGB BLD-MCNC: 11.9 G/DL
IMM GRANULOCYTES NFR BLD AUTO: 0.3 %
KETONES URINE: NEGATIVE MG/DL
LDLC SERPL CALC-MCNC: 99 MG/DL
LEUKOCYTE ESTERASE URINE: ABNORMAL
LYMPHOCYTES # BLD AUTO: 1.36 K/UL
LYMPHOCYTES NFR BLD AUTO: 20 %
MAN DIFF?: NORMAL
MCHC RBC-ENTMCNC: 29.6 PG
MCHC RBC-ENTMCNC: 33.7 G/DL
MCV RBC AUTO: 87.8 FL
MICROALBUMIN 24H UR DL<=1MG/L-MCNC: 2.2 MG/DL
MICROALBUMIN/CREAT 24H UR-RTO: 56 MG/G
MONOCYTES # BLD AUTO: 0.28 K/UL
MONOCYTES NFR BLD AUTO: 4.1 %
NEUTROPHILS # BLD AUTO: 4.97 K/UL
NEUTROPHILS NFR BLD AUTO: 73.2 %
NITRITE URINE: NEGATIVE
NONHDLC SERPL-MCNC: 130 MG/DL
PH URINE: 5.5
PLATELET # BLD AUTO: 221 K/UL
POTASSIUM SERPL-SCNC: 4.1 MMOL/L
PROT SERPL-MCNC: 6.9 G/DL
PROTEIN URINE: NEGATIVE MG/DL
RBC # BLD: 4.02 M/UL
RBC # FLD: 13.6 %
SODIUM SERPL-SCNC: 139 MMOL/L
SPECIFIC GRAVITY URINE: 1.01
T4 FREE SERPL-MCNC: 1.2 NG/DL
TRIGL SERPL-MCNC: 180 MG/DL
TSH SERPL-ACNC: 0.05 UIU/ML
UROBILINOGEN URINE: 0.2 MG/DL
VIT B12 SERPL-MCNC: 676 PG/ML
WBC # FLD AUTO: 6.79 K/UL

## 2025-02-21 RX ORDER — LEVOTHYROXINE SODIUM 0.05 MG/1
50 TABLET ORAL DAILY
Qty: 1 | Refills: 3 | Status: ACTIVE | COMMUNITY
Start: 2025-02-21 | End: 1900-01-01

## 2025-02-25 ENCOUNTER — NON-APPOINTMENT (OUTPATIENT)
Age: 75
End: 2025-02-25

## (undated) DEVICE — NEPTUNE II 4-PORT MANIFOLD

## (undated) DEVICE — POSITIONER FOAM HEAD DONUT 9" (PINK)

## (undated) DEVICE — DOPPLER PROBE 280MM 20MHZ BAYONETED STERILE

## (undated) DEVICE — DRAPE INSTRUMENT POUCH 6.75" X 11"

## (undated) DEVICE — TIP UNIV SUPERLONG STR

## (undated) DEVICE — TUBING SUCTION 20FT

## (undated) DEVICE — Device

## (undated) DEVICE — NDL ELECTRODE ULTRACLEAN 6

## (undated) DEVICE — SUT VICRYL 3-0 27" SH UNDYED

## (undated) DEVICE — STAPLER SKIN PROXIMATE

## (undated) DEVICE — POSITIONER FOAM EGG CRATE ULNAR 2PCS (PINK)

## (undated) DEVICE — DRAPE MAGNETIC INSTRUMENT MEDIUM

## (undated) DEVICE — FOLEY TRAY 16FR 5CC LF UMETER CLOSED

## (undated) DEVICE — SUT PROLENE 2-0 30" CT-2

## (undated) DEVICE — MINI DOPPLER PROBE

## (undated) DEVICE — DRSG MASTISOL

## (undated) DEVICE — CRANIAL MASK TRACKER

## (undated) DEVICE — PREP BETADINE KIT

## (undated) DEVICE — DRSG TEGADERM 1.75X1.75"

## (undated) DEVICE — STRYKER INSTRUMENT BATTERY

## (undated) DEVICE — ELCTR BOVIE SUCTION 8FR 6"

## (undated) DEVICE — GLV 6.5 PROTEXIS (WHITE)

## (undated) DEVICE — GLV 7 PROTEXIS (WHITE)

## (undated) DEVICE — ELCTR HOLSTER ACCESSORY

## (undated) DEVICE — SUCTION CATH AIRLIFE CONTROL VALVE TRIFLO 14FR

## (undated) DEVICE — DRAPE TOWEL BLUE STICKY

## (undated) DEVICE — STRYKER SONOPET IQ TIP 20CM STANDARD

## (undated) DEVICE — SYR LUER LOK 50CC

## (undated) DEVICE — MARKING PEN W RULER

## (undated) DEVICE — GOWN XL

## (undated) DEVICE — PACK UPPER BODY

## (undated) DEVICE — TUBING STRYKER SET AND EXTENDER FILTER TUBING

## (undated) DEVICE — NDL SPINAL 18G X 3.5" (PINK)

## (undated) DEVICE — GOWN ROYAL SILK XL

## (undated) DEVICE — SYR ASEPTO

## (undated) DEVICE — DRAPE SURGICAL #1010

## (undated) DEVICE — SUT PLAIN GUT 4-0 18" SC-1

## (undated) DEVICE — DRSG TEGADERM 2.5X3"

## (undated) DEVICE — SUT MONOCRYL 4-0 27" PS-2 UNDYED

## (undated) DEVICE — DRAPE LIGHT HANDLE COVER (GREEN)

## (undated) DEVICE — DRAPE TOWEL BLUE 17" X 24"

## (undated) DEVICE — DRSG STERISTRIPS 0.5 X 4"

## (undated) DEVICE — BLADE MEDTRONIC ENT QUADCUT ROTATABLE STRAIGHT 4MM X 13CM

## (undated) DEVICE — ELCTR STRYKER NEPTUNE SMOKE EVACUATION PENCIL (GREEN)

## (undated) DEVICE — SPECIMEN CONTAINER 100ML

## (undated) DEVICE — SOL ANTI FOG

## (undated) DEVICE — DRSG MEROCEL STANDARD W STRING 8CM

## (undated) DEVICE — GLV 8 PROTEXIS (WHITE)

## (undated) DEVICE — STORZ DURA MICRO KNIFE INSERT SICKLE-SHAPED